# Patient Record
Sex: FEMALE | Race: BLACK OR AFRICAN AMERICAN | NOT HISPANIC OR LATINO | Employment: FULL TIME | URBAN - METROPOLITAN AREA
[De-identification: names, ages, dates, MRNs, and addresses within clinical notes are randomized per-mention and may not be internally consistent; named-entity substitution may affect disease eponyms.]

---

## 2022-12-13 ENCOUNTER — LAB REQUISITION (OUTPATIENT)
Dept: LAB | Facility: HOSPITAL | Age: 57
End: 2022-12-13
Payer: COMMERCIAL

## 2022-12-13 DIAGNOSIS — L08.0 PYODERMA: ICD-10-CM

## 2022-12-13 PROCEDURE — 87070 CULTURE OTHR SPECIMN AEROBIC: CPT | Performed by: SPECIALIST

## 2022-12-16 LAB — BACTERIA SPEC CULT: NORMAL

## 2023-07-28 ENCOUNTER — TELEPHONE (OUTPATIENT)
Dept: TRANSPLANT | Facility: CLINIC | Age: 58
End: 2023-07-28
Payer: COMMERCIAL

## 2023-07-31 ENCOUNTER — TELEPHONE (OUTPATIENT)
Dept: TRANSPLANT | Facility: CLINIC | Age: 58
End: 2023-07-31
Payer: COMMERCIAL

## 2023-12-21 DIAGNOSIS — N28.9 RENAL DISEASE: Primary | ICD-10-CM

## 2023-12-21 RX ORDER — VALACYCLOVIR HYDROCHLORIDE 1 G/1
TABLET, FILM COATED ORAL
COMMUNITY
Start: 2023-12-11 | End: 2023-12-22 | Stop reason: ALTCHOICE

## 2023-12-21 RX ORDER — ACETAMINOPHEN 500 MG
3 TABLET ORAL DAILY
COMMUNITY

## 2023-12-21 RX ORDER — HYDROCHLOROTHIAZIDE 12.5 MG/1
12.5 TABLET ORAL
COMMUNITY
Start: 2023-11-14 | End: 2023-12-22 | Stop reason: ALTCHOICE

## 2023-12-21 RX ORDER — VALSARTAN 160 MG/1
160 TABLET ORAL
COMMUNITY
Start: 2023-12-21 | End: 2024-01-25

## 2023-12-21 RX ORDER — SODIUM BICARBONATE 650 MG/1
650 TABLET ORAL 3 TIMES DAILY
COMMUNITY
Start: 2023-01-30 | End: 2024-03-12

## 2023-12-21 RX ORDER — AMLODIPINE BESYLATE 10 MG/1
10 TABLET ORAL
COMMUNITY
End: 2023-12-22 | Stop reason: ALTCHOICE

## 2023-12-21 RX ORDER — POTASSIUM CHLORIDE 20 MEQ/1
20 TABLET, EXTENDED RELEASE ORAL DAILY
Status: ON HOLD | COMMUNITY
End: 2024-03-15

## 2023-12-21 RX ORDER — AZATHIOPRINE 50 MG/1
50 TABLET ORAL DAILY
COMMUNITY
Start: 2023-11-08

## 2023-12-21 RX ORDER — ASCORBIC ACID 500 MG
500 TABLET ORAL
COMMUNITY
End: 2023-12-22 | Stop reason: ALTCHOICE

## 2023-12-21 RX ORDER — ALLOPURINOL 100 MG/1
100 TABLET ORAL
COMMUNITY
Start: 2023-11-14 | End: 2024-01-25

## 2023-12-21 RX ORDER — PREDNISONE 10 MG/1
10 TABLET ORAL DAILY
COMMUNITY
Start: 2023-11-08

## 2023-12-21 RX ORDER — VITAMIN B COMPLEX
1 CAPSULE ORAL EVERY MORNING
COMMUNITY

## 2023-12-22 ENCOUNTER — OFFICE VISIT (OUTPATIENT)
Dept: SURGERY | Facility: CLINIC | Age: 58
End: 2023-12-22
Payer: COMMERCIAL

## 2023-12-22 VITALS
DIASTOLIC BLOOD PRESSURE: 76 MMHG | BODY MASS INDEX: 28.51 KG/M2 | HEART RATE: 98 BPM | SYSTOLIC BLOOD PRESSURE: 134 MMHG | HEIGHT: 67 IN | WEIGHT: 181.63 LBS

## 2023-12-22 DIAGNOSIS — N28.9 RENAL DISEASE: ICD-10-CM

## 2023-12-22 DIAGNOSIS — N28.9 RENAL DISEASE: Primary | ICD-10-CM

## 2023-12-22 DIAGNOSIS — Z01.818 PRE-OPERATIVE EXAMINATION: Primary | ICD-10-CM

## 2023-12-22 PROCEDURE — 4010F PR ACE/ARB THEARPY RXD/TAKEN: ICD-10-PCS | Mod: CPTII,,, | Performed by: SURGERY

## 2023-12-22 PROCEDURE — 3075F SYST BP GE 130 - 139MM HG: CPT | Mod: CPTII,,, | Performed by: SURGERY

## 2023-12-22 PROCEDURE — 3078F DIAST BP <80 MM HG: CPT | Mod: CPTII,,, | Performed by: SURGERY

## 2023-12-22 PROCEDURE — 99204 OFFICE O/P NEW MOD 45 MIN: CPT | Mod: ,,, | Performed by: SURGERY

## 2023-12-22 PROCEDURE — 4010F ACE/ARB THERAPY RXD/TAKEN: CPT | Mod: CPTII,,, | Performed by: SURGERY

## 2023-12-22 PROCEDURE — 3075F PR MOST RECENT SYSTOLIC BLOOD PRESS GE 130-139MM HG: ICD-10-PCS | Mod: CPTII,,, | Performed by: SURGERY

## 2023-12-22 PROCEDURE — 3078F PR MOST RECENT DIASTOLIC BLOOD PRESSURE < 80 MM HG: ICD-10-PCS | Mod: CPTII,,, | Performed by: SURGERY

## 2023-12-22 PROCEDURE — 1159F PR MEDICATION LIST DOCUMENTED IN MEDICAL RECORD: ICD-10-PCS | Mod: CPTII,,, | Performed by: SURGERY

## 2023-12-22 PROCEDURE — 1159F MED LIST DOCD IN RCRD: CPT | Mod: CPTII,,, | Performed by: SURGERY

## 2023-12-22 PROCEDURE — 3008F PR BODY MASS INDEX (BMI) DOCUMENTED: ICD-10-PCS | Mod: CPTII,,, | Performed by: SURGERY

## 2023-12-22 PROCEDURE — 3008F BODY MASS INDEX DOCD: CPT | Mod: CPTII,,, | Performed by: SURGERY

## 2023-12-22 PROCEDURE — 99204 PR OFFICE/OUTPT VISIT, NEW, LEVL IV, 45-59 MIN: ICD-10-PCS | Mod: ,,, | Performed by: SURGERY

## 2023-12-22 RX ORDER — ZINC GLUCONATE 50 MG
50 TABLET ORAL DAILY
COMMUNITY

## 2023-12-22 NOTE — PROGRESS NOTES
Patient ID: [id number]     Chief Complaint: Consult (PD cath placement)      HPI:     Sulma Gonzales is a 58 y.o. female here today for evaluation for peritoneal dialysis catheter.  Patient had a kidney transplant 39 years ago which has since failed, she was now on hemodialysis with a tunneled hemodialysis catheter, she was referred to me for placement of peritoneal dialysis catheter, she prefers this method and that is the reason for her visit today    Past Medical History:   Diagnosis Date    Allergy     Iodine    Anemia     Chronic kidney disease on chronic dialysis     Chronic kidney disease, unspecified     Encounter for blood transfusion 3033    Hypertension     Menopause     Vertigo     From covid shot        Past Surgical History:   Procedure Laterality Date     SECTION      HYSTERECTOMY      KIDNEY TRANSPLANT      TUBAL LIGATION          Social History     Tobacco Use    Smoking status: Never    Smokeless tobacco: Never   Substance and Sexual Activity    Alcohol use: Never    Drug use: Never    Sexual activity: Yes     Partners: Male        Current Outpatient Medications   Medication Instructions    allopurinoL (ZYLOPRIM) 100 MG tablet Oral    azaTHIOprine (IMURAN) 50 mg, Oral    b complex vitamins capsule 1 capsule, Oral, Every morning    cholecalciferol, vitamin D3, (VITAMIN D3) 50 mcg (2,000 unit) Cap capsule 1 tablet, Oral, Daily    FERROUS SULFATE ORAL 3 tablets, Oral, Daily    potassium chloride SA (K-DUR,KLOR-CON) 20 MEQ tablet 20 mEq, Oral    predniSONE (DELTASONE) 10 mg, Oral    sodium bicarbonate 650 mg, Oral    valsartan (DIOVAN) 160 mg, Oral    zinc gluconate 50 mg, Oral, Daily       Review of patient's allergies indicates:   Allergen Reactions    Iodine     Shellfish containing products Rash        Patient Care Team:  No, Primary Doctor as PCP - General  Julio Lofton MD as Surgeon (General Surgery)  Yakelin Person Jr. (Internal Medicine)  Lebron Singleton MD  "(Nephrology)  [facility], Cardiology Specialist Of [facility]     Subjective:     Review of Systems    12 point review of systems conducted, negative except as stated in the history of present illness. See HPI for details.  No fever chills nausea vomiting new line no diarrhea constipation hematochezia or hemoptysis   No wheezing shortness of breath  No chest pain palpitations or pedal edema  Objective:     Visit Vitals  /76 (BP Location: Right arm, Patient Position: Sitting)   Pulse 98   Ht 5' 7" (1.702 m)   Wt 82.4 kg (181 lb 9.6 oz)   BMI 28.44 kg/m²       Physical Exam  Constitutional:       Appearance: Normal appearance.   HENT:      Head: Normocephalic and atraumatic.      Mouth/Throat:      Mouth: Mucous membranes are moist.      Pharynx: Oropharynx is clear.   Eyes:      Extraocular Movements: Extraocular movements intact.      Conjunctiva/sclera: Conjunctivae normal.      Pupils: Pupils are equal, round, and reactive to light.   Neck:      Vascular: No carotid bruit.   Cardiovascular:      Rate and Rhythm: Normal rate and regular rhythm.   Pulmonary:      Effort: No respiratory distress.      Breath sounds: Stridor present. Rhonchi present. No wheezing or rales.   Chest:      Chest wall: Tenderness present.   Abdominal:      General: There is no distension.      Palpations: There is no mass.      Tenderness: There is no abdominal tenderness. There is no guarding or rebound.   Musculoskeletal:      Cervical back: No rigidity or tenderness.   Lymphadenopathy:      Cervical: No cervical adenopathy.   Neurological:      General: No focal deficit present.      Mental Status: She is alert and oriented to person, place, and time. Mental status is at baseline.      Cranial Nerves: No cranial nerve deficit.   Psychiatric:         Mood and Affect: Mood normal.         Thought Content: Thought content normal.         Judgment: Judgment normal.         Assessment:       ICD-10-CM ICD-9-CM   1. Pre-operative examination  " Z01.818 V72.84   2. Renal disease  N28.9 593.9        Plan:     1. Pre-operative examination  -     X-Ray Chest 1 View; Future; Expected date: 12/22/2023  -     EKG 12-lead; Future; Expected date: 12/22/2023    2. Renal disease  -     Ambulatory referral/consult to General Surgery     We will schedule for laparoscopic placement of peritoneal dialysis catheter on January 2, 202 4    No follow-ups on file. In addition to their scheduled follow up, the patient has also been instructed to follow up on as needed basis.     No future appointments.     Julio Lofton MD

## 2023-12-22 NOTE — H&P (VIEW-ONLY)
Patient ID: [id number]     Chief Complaint: Consult (PD cath placement)      HPI:     Sulma Gonzales is a 58 y.o. female here today for evaluation for peritoneal dialysis catheter.  Patient had a kidney transplant 39 years ago which has since failed, she was now on hemodialysis with a tunneled hemodialysis catheter, she was referred to me for placement of peritoneal dialysis catheter, she prefers this method and that is the reason for her visit today    Past Medical History:   Diagnosis Date    Allergy     Iodine    Anemia     Chronic kidney disease on chronic dialysis     Chronic kidney disease, unspecified     Encounter for blood transfusion 3033    Hypertension     Menopause     Vertigo     From covid shot        Past Surgical History:   Procedure Laterality Date     SECTION      HYSTERECTOMY      KIDNEY TRANSPLANT      TUBAL LIGATION          Social History     Tobacco Use    Smoking status: Never    Smokeless tobacco: Never   Substance and Sexual Activity    Alcohol use: Never    Drug use: Never    Sexual activity: Yes     Partners: Male        Current Outpatient Medications   Medication Instructions    allopurinoL (ZYLOPRIM) 100 MG tablet Oral    azaTHIOprine (IMURAN) 50 mg, Oral    b complex vitamins capsule 1 capsule, Oral, Every morning    cholecalciferol, vitamin D3, (VITAMIN D3) 50 mcg (2,000 unit) Cap capsule 1 tablet, Oral, Daily    FERROUS SULFATE ORAL 3 tablets, Oral, Daily    potassium chloride SA (K-DUR,KLOR-CON) 20 MEQ tablet 20 mEq, Oral    predniSONE (DELTASONE) 10 mg, Oral    sodium bicarbonate 650 mg, Oral    valsartan (DIOVAN) 160 mg, Oral    zinc gluconate 50 mg, Oral, Daily       Review of patient's allergies indicates:   Allergen Reactions    Iodine     Shellfish containing products Rash        Patient Care Team:  No, Primary Doctor as PCP - General  Julio Lofton MD as Surgeon (General Surgery)  Yakelin Person Jr. (Internal  "Medicine)  Lebron Singleton MD (Nephrology)  [facility], Cardiology Specialist Of [facility]     Subjective:     Review of Systems    12 point review of systems conducted, negative except as stated in the history of present illness. See HPI for details.  No fever chills nausea vomiting new line no diarrhea constipation hematochezia or hemoptysis   No wheezing shortness of breath  No chest pain palpitations or pedal edema  Objective:     Visit Vitals  /76 (BP Location: Right arm, Patient Position: Sitting)   Pulse 98   Ht 5' 7" (1.702 m)   Wt 82.4 kg (181 lb 9.6 oz)   BMI 28.44 kg/m²       Physical Exam  Constitutional:       Appearance: Normal appearance.   HENT:      Head: Normocephalic and atraumatic.      Mouth/Throat:      Mouth: Mucous membranes are moist.      Pharynx: Oropharynx is clear.   Eyes:      Extraocular Movements: Extraocular movements intact.      Conjunctiva/sclera: Conjunctivae normal.      Pupils: Pupils are equal, round, and reactive to light.   Neck:      Vascular: No carotid bruit.   Cardiovascular:      Rate and Rhythm: Normal rate and regular rhythm.   Pulmonary:      Effort: No respiratory distress.      Breath sounds: Stridor present. Rhonchi present. No wheezing or rales.   Chest:      Chest wall: Tenderness present.   Abdominal:      General: There is no distension.      Palpations: There is no mass.      Tenderness: There is no abdominal tenderness. There is no guarding or rebound.   Musculoskeletal:      Cervical back: No rigidity or tenderness.   Lymphadenopathy:      Cervical: No cervical adenopathy.   Neurological:      General: No focal deficit present.      Mental Status: She is alert and oriented to person, place, and time. Mental status is at baseline.      Cranial Nerves: No cranial nerve deficit.   Psychiatric:         Mood and Affect: Mood normal.         Thought Content: Thought content normal.         Judgment: Judgment normal.         Assessment:       ICD-10-CM ICD-9-CM "   1. Pre-operative examination  Z01.818 V72.84   2. Renal disease  N28.9 593.9        Plan:     1. Pre-operative examination  -     X-Ray Chest 1 View; Future; Expected date: 12/22/2023  -     EKG 12-lead; Future; Expected date: 12/22/2023    2. Renal disease  -     Ambulatory referral/consult to General Surgery     We will schedule for laparoscopic placement of peritoneal dialysis catheter on January 2, 202 4    No follow-ups on file. In addition to their scheduled follow up, the patient has also been instructed to follow up on as needed basis.     No future appointments.     Julio Lofton MD

## 2023-12-29 NOTE — DISCHARGE INSTRUCTIONS
Patient Education       Peritoneal Dialysis Catheter Placement Discharge Instructions   About this topic   Peritoneal dialysis, or PD, replaces the work of your kidneys. It removes extra water, wastes, and chemicals from your body when your kidneys are not working normally. This type of dialysis uses the lining of your belly, the peritoneum, to filter the wastes from your blood. Your doctor places a tube or PD catheter in your belly. The doctor leaves about 6 to 12 inches (15 to 30 cm) of this tube outside of your belly. Then, you are able to put the dialysis solution in your body through the tube. Later, the fluid, extra water, and wastes all drain out of this same tube.     What care is needed at home?   Learn about your catheter.  You will work with a nurse after you leave the hospital. The nurse will help you learn how to care for your PD catheter. You will also learn how to exchange the PD fluid. This will be based on what kind of dialysis is ordered for you.  Keep your supplies for your PD in a clean, dry space.  Change your bandages around your catheter as told by your doctor.   You may take a bath or shower as told by your doctor.  Do not lift anything over 10 pounds (4.5 kg) until released by your doctor.  Ask your doctor when you may go back to your normal activities like work or driving  Clean your catheter.  Wash your hands with care before and after you touch the catheter or dressing and use a mask.  Hold the catheter during cleaning to avoid accidental pulling.  Clean the skin on all sides of the catheter every day as your doctor ordered.  Do not remove any scabs or crusting. The catheter has cuffs or bands of fuzzy Dacron under your skin. Your skin grows over the cuffs to help hold the catheter in place. This also helps to stop infection.  Your doctor may order an ointment or cream that you need to put on to the catheter exit site when you change the dressing. Use a clean cotton-tip swab to put on  the ointment or cream.  Secure your catheter.  Make sure your catheter it taped to your belly. Do not let it hang loose or dangle.  Use tapes and dressings that will let air enter the skin.  Loop your catheter to make a C shape before putting the dressing on. This will give a little support if you accidentally pull on your catheter.  Wear loose clothing that will not rub on your catheter.  Ask your doctor about underwear and belts mostly built for people with peritoneal dialysis catheters.  What follow-up care is needed?   Be sure to keep your follow up visit.  You may need to return to your doctor once a week to have your catheter flushed until you are able to use it.  If you have stitches or staples, you will need to have them taken out. Your doctor will often want to do this in 1 to 2 weeks.  Will physical activity be limited?   You may need to limit your movements when your peritoneal cavity is full. Talk to your doctor about the right amount of activity for you.  Try not to go to crowded places where your belly can easily be bumped.  What changes to diet are needed?   Your doctor will have you meet with a dietitian. Together you can plan your meals to help you stay healthy. You need to know the salt and potassium content of the foods you eat. Read food labels with care. They will show you how much of each of these is in a serving. This amount is given as a percentage of the total amount you need each day. Reading the labels will help you make healthy food choices. You may need to adjust your eating based on how well your PD treatment is working. Ask your doctor how much fluid you should drink each day.  What problems could happen?   Infection  Bleeding  Damage to internal organs  Tube does not work or is in the wrong place  Fluid leaks around the catheter  Hernia  High blood sugar levels  Weight gain  Catheter clogs  Peritoneal dialysis does not work well enough  When do I need to call the doctor?   Signs of  infection. These include a fever of 100.4°F (38°C) or higher, chills, or belly pain which may be mild to very bad.  Signs of wound infection. These include swelling, redness, warmth around the wound; too much pain when touched; yellowish, greenish, or bloody discharge; foul smell coming from the catheter site; catheter site opens up.  Dialysis solution looks cloudy or bloody  Very bad upset stomach and throwing up  Helpful tips   Do not warm your PD solution in a microwave.  Plan ahead to get supplies so you do not run out of them.

## 2023-12-30 ENCOUNTER — ANESTHESIA EVENT (OUTPATIENT)
Dept: SURGERY | Facility: HOSPITAL | Age: 58
End: 2023-12-30
Payer: COMMERCIAL

## 2023-12-30 RX ORDER — SODIUM CHLORIDE, SODIUM LACTATE, POTASSIUM CHLORIDE, CALCIUM CHLORIDE 600; 310; 30; 20 MG/100ML; MG/100ML; MG/100ML; MG/100ML
INJECTION, SOLUTION INTRAVENOUS CONTINUOUS
Status: CANCELLED | OUTPATIENT
Start: 2023-12-30

## 2023-12-30 RX ORDER — LIDOCAINE HYDROCHLORIDE 10 MG/ML
1 INJECTION, SOLUTION EPIDURAL; INFILTRATION; INTRACAUDAL; PERINEURAL ONCE
Status: CANCELLED | OUTPATIENT
Start: 2023-12-30 | End: 2023-12-30

## 2023-12-30 NOTE — ANESTHESIA PREPROCEDURE EVALUATION
12/30/2023  Sulma Gonzales is a 58 y.o., female with HTN and ESRD for placement of peritoneal dialysis catheter.      Pre-op Assessment    I have reviewed the Patient Summary Reports.     I have reviewed the Nursing Notes. I have reviewed the NPO Status.   I have reviewed the Medications.     Review of Systems  Anesthesia Hx:  No problems with previous Anesthesia             Denies Family Hx of Anesthesia complications.    Denies Personal Hx of Anesthesia complications.                    Hematology/Oncology:  Hematology Normal   Oncology Normal                                   EENT/Dental:  EENT/Dental Normal    Ears General/Symptom(s)    Ear Disease:  Tympanic Membrane Problem        Cardiovascular:  Cardiovascular Normal                                            Pulmonary:  Pulmonary Normal                       Renal/:  Renal/ Normal                 Hepatic/GI:  Hepatic/GI Normal                 Musculoskeletal:  Musculoskeletal Normal                Neurological:  Neurology Normal                                      Endocrine:  Endocrine Normal            Dermatological:  Skin Normal    Psych:  Psychiatric Normal                    Physical Exam  General: Well nourished    Airway:  Mallampati: II   Mouth Opening: Normal  TM Distance: Normal  Tongue: Normal  Neck ROM: Normal ROM    Dental:  Intact    Chest/Lungs:  Clear to auscultation, Normal Respiratory Rate    Heart:  Rate: Normal  Rhythm: Regular Rhythm        Anesthesia Plan  Type of Anesthesia, risks & benefits discussed:    Anesthesia Type: Gen ETT  Intra-op Monitoring Plan: Standard ASA Monitors  Post Op Pain Control Plan: multimodal analgesia and IV/PO Opioids PRN  Induction:  IV  Airway Plan: Direct  Informed Consent: Informed consent signed with the Patient and all parties understand the risks and agree with anesthesia plan.  All  questions answered.   ASA Score: 3  Day of Surgery Review of History & Physical: H&P Update referred to the surgeon/provider.I have interviewed and examined the patient. I have reviewed the patient's H&P dated: There are no significant changes. H&P completed by Anesthesiologist.    Ready For Surgery From Anesthesia Perspective.     .

## 2023-12-31 RX ORDER — LIDOCAINE HYDROCHLORIDE 10 MG/ML
1 INJECTION, SOLUTION EPIDURAL; INFILTRATION; INTRACAUDAL; PERINEURAL ONCE AS NEEDED
Status: CANCELLED | OUTPATIENT
Start: 2023-12-31 | End: 2035-05-29

## 2024-01-02 ENCOUNTER — ANESTHESIA (OUTPATIENT)
Dept: SURGERY | Facility: HOSPITAL | Age: 59
End: 2024-01-02
Payer: COMMERCIAL

## 2024-01-02 ENCOUNTER — HOSPITAL ENCOUNTER (OUTPATIENT)
Facility: HOSPITAL | Age: 59
Discharge: HOME | End: 2024-01-02
Attending: SURGERY | Admitting: SURGERY
Payer: COMMERCIAL

## 2024-01-02 LAB
ANION GAP SERPL CALC-SCNC: 16 MMOL/L (ref 8–16)
BUN SERPL-MCNC: 28 MG/DL (ref 6–30)
CHLORIDE SERPL-SCNC: 105 MMOL/L (ref 95–110)
CREAT SERPL-MCNC: 4.8 MG/DL (ref 0.5–1.4)
GLUCOSE SERPL-MCNC: 91 MG/DL (ref 70–110)
HCT VFR BLD CALC: 33 %PCV (ref 36–54)
HGB BLD-MCNC: 11 G/DL
POC IONIZED CALCIUM: 1.25 MMOL/L (ref 1.06–1.42)
POC TCO2 (MEASURED): 25 MMOL/L (ref 23–29)
POTASSIUM BLD-SCNC: 3.9 MMOL/L (ref 3.5–5.1)
SAMPLE: ABNORMAL
SODIUM BLD-SCNC: 141 MMOL/L (ref 136–145)

## 2024-01-02 PROCEDURE — 37000008 HC ANESTHESIA 1ST 15 MINUTES: Performed by: SURGERY

## 2024-01-02 PROCEDURE — 49324 LAP INSERT TUNNEL IP CATH: CPT | Mod: ,,, | Performed by: SURGERY

## 2024-01-02 PROCEDURE — 25000003 PHARM REV CODE 250: Performed by: SURGERY

## 2024-01-02 PROCEDURE — 25000003 PHARM REV CODE 250: Performed by: ANESTHESIOLOGY

## 2024-01-02 PROCEDURE — C1750 CATH, HEMODIALYSIS,LONG-TERM: HCPCS | Performed by: SURGERY

## 2024-01-02 PROCEDURE — D9220A PRA ANESTHESIA: Mod: ANES,,, | Performed by: ANESTHESIOLOGY

## 2024-01-02 PROCEDURE — 71000016 HC POSTOP RECOV ADDL HR: Performed by: SURGERY

## 2024-01-02 PROCEDURE — 37000009 HC ANESTHESIA EA ADD 15 MINS: Performed by: SURGERY

## 2024-01-02 PROCEDURE — D9220A PRA ANESTHESIA: Mod: CRNA,,, | Performed by: NURSE ANESTHETIST, CERTIFIED REGISTERED

## 2024-01-02 PROCEDURE — 71000033 HC RECOVERY, INTIAL HOUR: Performed by: SURGERY

## 2024-01-02 PROCEDURE — 63600175 PHARM REV CODE 636 W HCPCS: Performed by: SURGERY

## 2024-01-02 PROCEDURE — 63600175 PHARM REV CODE 636 W HCPCS: Performed by: NURSE ANESTHETIST, CERTIFIED REGISTERED

## 2024-01-02 PROCEDURE — 63600175 PHARM REV CODE 636 W HCPCS

## 2024-01-02 PROCEDURE — 63600175 PHARM REV CODE 636 W HCPCS: Performed by: ANESTHESIOLOGY

## 2024-01-02 PROCEDURE — 27201423 OPTIME MED/SURG SUP & DEVICES STERILE SUPPLY: Performed by: SURGERY

## 2024-01-02 PROCEDURE — 71000015 HC POSTOP RECOV 1ST HR: Performed by: SURGERY

## 2024-01-02 PROCEDURE — 36000708 HC OR TIME LEV III 1ST 15 MIN: Performed by: SURGERY

## 2024-01-02 PROCEDURE — 36000709 HC OR TIME LEV III EA ADD 15 MIN: Performed by: SURGERY

## 2024-01-02 PROCEDURE — 25000003 PHARM REV CODE 250: Performed by: NURSE ANESTHETIST, CERTIFIED REGISTERED

## 2024-01-02 DEVICE — CATH SWAN MISSOURI L 62.5CM: Type: IMPLANTABLE DEVICE | Site: ABDOMEN | Status: FUNCTIONAL

## 2024-01-02 RX ORDER — METOCLOPRAMIDE HYDROCHLORIDE 5 MG/ML
10 INJECTION INTRAMUSCULAR; INTRAVENOUS ONCE AS NEEDED
Status: ACTIVE | OUTPATIENT
Start: 2024-01-02 | End: 2035-05-31

## 2024-01-02 RX ORDER — PHENYLEPHRINE HYDROCHLORIDE 10 MG/ML
INJECTION INTRAVENOUS
Status: DISCONTINUED | OUTPATIENT
Start: 2024-01-02 | End: 2024-01-02

## 2024-01-02 RX ORDER — ACETAMINOPHEN 500 MG
1000 TABLET ORAL
Status: COMPLETED | OUTPATIENT
Start: 2024-01-02 | End: 2024-01-02

## 2024-01-02 RX ORDER — GABAPENTIN 300 MG/1
300 CAPSULE ORAL
Status: COMPLETED | OUTPATIENT
Start: 2024-01-02 | End: 2024-01-02

## 2024-01-02 RX ORDER — HEPARIN SODIUM 5000 [USP'U]/ML
INJECTION, SOLUTION INTRAVENOUS; SUBCUTANEOUS
Status: DISCONTINUED | OUTPATIENT
Start: 2024-01-02 | End: 2024-01-02 | Stop reason: HOSPADM

## 2024-01-02 RX ORDER — HEPARIN SODIUM 5000 [USP'U]/ML
INJECTION, SOLUTION INTRAVENOUS; SUBCUTANEOUS
Status: DISCONTINUED
Start: 2024-01-02 | End: 2024-01-02 | Stop reason: HOSPADM

## 2024-01-02 RX ORDER — HYDROMORPHONE HYDROCHLORIDE 2 MG/ML
0.2 INJECTION, SOLUTION INTRAMUSCULAR; INTRAVENOUS; SUBCUTANEOUS EVERY 5 MIN PRN
Status: ACTIVE | OUTPATIENT
Start: 2024-01-02

## 2024-01-02 RX ORDER — OXYCODONE HYDROCHLORIDE 5 MG/1
5 TABLET ORAL
Status: ACTIVE | OUTPATIENT
Start: 2024-01-02

## 2024-01-02 RX ORDER — PROPOFOL 10 MG/ML
VIAL (ML) INTRAVENOUS
Status: DISCONTINUED | OUTPATIENT
Start: 2024-01-02 | End: 2024-01-02

## 2024-01-02 RX ORDER — CEFAZOLIN SODIUM 1 G/3ML
INJECTION, POWDER, FOR SOLUTION INTRAMUSCULAR; INTRAVENOUS
Status: DISCONTINUED
Start: 2024-01-02 | End: 2024-01-02 | Stop reason: HOSPADM

## 2024-01-02 RX ORDER — TRAMADOL HYDROCHLORIDE 50 MG/1
100 TABLET ORAL
Status: COMPLETED | OUTPATIENT
Start: 2024-01-02 | End: 2024-01-02

## 2024-01-02 RX ORDER — DROPERIDOL 2.5 MG/ML
0.25 INJECTION, SOLUTION INTRAMUSCULAR; INTRAVENOUS ONCE AS NEEDED
Status: DISPENSED | OUTPATIENT
Start: 2024-01-02 | End: 2035-05-31

## 2024-01-02 RX ORDER — ONDANSETRON HYDROCHLORIDE 2 MG/ML
INJECTION, SOLUTION INTRAMUSCULAR; INTRAVENOUS
Status: DISCONTINUED | OUTPATIENT
Start: 2024-01-02 | End: 2024-01-02

## 2024-01-02 RX ORDER — BUPIVACAINE HYDROCHLORIDE AND EPINEPHRINE 2.5; 5 MG/ML; UG/ML
INJECTION, SOLUTION EPIDURAL; INFILTRATION; INTRACAUDAL; PERINEURAL
Status: DISCONTINUED | OUTPATIENT
Start: 2024-01-02 | End: 2024-01-02 | Stop reason: HOSPADM

## 2024-01-02 RX ORDER — CEFAZOLIN SODIUM IN 0.9 % NACL 2 G/100 ML
PLASTIC BAG, INJECTION (ML) INTRAVENOUS
Status: DISCONTINUED | OUTPATIENT
Start: 2024-01-02 | End: 2024-01-02

## 2024-01-02 RX ORDER — BUPIVACAINE HYDROCHLORIDE 5 MG/ML
INJECTION, SOLUTION EPIDURAL; INTRACAUDAL
Status: DISCONTINUED
Start: 2024-01-02 | End: 2024-01-02 | Stop reason: HOSPADM

## 2024-01-02 RX ORDER — DIPHENHYDRAMINE HYDROCHLORIDE 50 MG/ML
6.25 INJECTION, SOLUTION INTRAMUSCULAR; INTRAVENOUS ONCE AS NEEDED
Status: ACTIVE | OUTPATIENT
Start: 2024-01-02 | End: 2035-05-31

## 2024-01-02 RX ORDER — LIDOCAINE HYDROCHLORIDE 10 MG/ML
INJECTION, SOLUTION EPIDURAL; INFILTRATION; INTRACAUDAL; PERINEURAL
Status: DISCONTINUED | OUTPATIENT
Start: 2024-01-02 | End: 2024-01-02

## 2024-01-02 RX ORDER — MIDAZOLAM HYDROCHLORIDE 1 MG/ML
1 INJECTION INTRAMUSCULAR; INTRAVENOUS ONCE AS NEEDED
Status: COMPLETED | OUTPATIENT
Start: 2024-01-02 | End: 2024-01-02

## 2024-01-02 RX ORDER — FENTANYL CITRATE 50 UG/ML
INJECTION, SOLUTION INTRAMUSCULAR; INTRAVENOUS
Status: DISCONTINUED | OUTPATIENT
Start: 2024-01-02 | End: 2024-01-02

## 2024-01-02 RX ORDER — SODIUM CHLORIDE, SODIUM LACTATE, POTASSIUM CHLORIDE, CALCIUM CHLORIDE 600; 310; 30; 20 MG/100ML; MG/100ML; MG/100ML; MG/100ML
INJECTION, SOLUTION INTRAVENOUS CONTINUOUS
Status: CANCELLED | OUTPATIENT
Start: 2024-01-02

## 2024-01-02 RX ORDER — SODIUM CHLORIDE 9 MG/ML
INJECTION, SOLUTION INTRAVENOUS CONTINUOUS
Status: ACTIVE | OUTPATIENT
Start: 2024-01-02

## 2024-01-02 RX ORDER — ROCURONIUM BROMIDE 10 MG/ML
INJECTION, SOLUTION INTRAVENOUS
Status: DISCONTINUED | OUTPATIENT
Start: 2024-01-02 | End: 2024-01-02

## 2024-01-02 RX ORDER — LIDOCAINE HYDROCHLORIDE 10 MG/ML
1 INJECTION, SOLUTION EPIDURAL; INFILTRATION; INTRACAUDAL; PERINEURAL ONCE
Status: CANCELLED | OUTPATIENT
Start: 2024-01-02 | End: 2024-01-02

## 2024-01-02 RX ADMIN — ROCURONIUM BROMIDE 30 MG: 50 INJECTION INTRAVENOUS at 10:01

## 2024-01-02 RX ADMIN — TRAMADOL HYDROCHLORIDE 100 MG: 50 TABLET, COATED ORAL at 09:01

## 2024-01-02 RX ADMIN — PHENYLEPHRINE HYDROCHLORIDE 100 MCG: 10 INJECTION INTRAVENOUS at 10:01

## 2024-01-02 RX ADMIN — SODIUM CHLORIDE: 9 INJECTION, SOLUTION INTRAVENOUS at 10:01

## 2024-01-02 RX ADMIN — Medication 2 G: at 10:01

## 2024-01-02 RX ADMIN — SUGAMMADEX 100 MG: 100 INJECTION, SOLUTION INTRAVENOUS at 10:01

## 2024-01-02 RX ADMIN — ONDANSETRON 4 MG: 2 INJECTION INTRAMUSCULAR; INTRAVENOUS at 10:01

## 2024-01-02 RX ADMIN — PHENYLEPHRINE HYDROCHLORIDE 150 MCG: 10 INJECTION INTRAVENOUS at 10:01

## 2024-01-02 RX ADMIN — MIDAZOLAM HYDROCHLORIDE 2 MG: 1 INJECTION, SOLUTION INTRAMUSCULAR; INTRAVENOUS at 10:01

## 2024-01-02 RX ADMIN — PROPOFOL 200 MG: 10 INJECTION, EMULSION INTRAVENOUS at 10:01

## 2024-01-02 RX ADMIN — FENTANYL CITRATE 50 MCG: 50 INJECTION, SOLUTION INTRAMUSCULAR; INTRAVENOUS at 10:01

## 2024-01-02 RX ADMIN — GABAPENTIN 300 MG: 300 CAPSULE ORAL at 09:01

## 2024-01-02 RX ADMIN — PHENYLEPHRINE HYDROCHLORIDE 200 MCG: 10 INJECTION INTRAVENOUS at 10:01

## 2024-01-02 RX ADMIN — ACETAMINOPHEN 1000 MG: 500 TABLET ORAL at 09:01

## 2024-01-02 RX ADMIN — LIDOCAINE HYDROCHLORIDE 20 MG: 10 INJECTION, SOLUTION EPIDURAL; INFILTRATION; INTRACAUDAL; PERINEURAL at 10:01

## 2024-01-02 NOTE — DISCHARGE SUMMARY
[facility] Surgical - Periop Services  Discharge Note  Short Stay    Procedure(s) (LRB):  INSERTION, CATHETER, DIALYSIS, PERITONEAL, LAPAROSCOPIC (N/A)      OUTCOME: Condition has improved and patient is now ready for discharge.    DISPOSITION: Home or Self Care    FINAL DIAGNOSIS:  <principal problem not specified>    FOLLOWUP: In clinic    DISCHARGE INSTRUCTIONS:  No discharge procedures on file.     TIME SPENT ON DISCHARGE: 5 minutes

## 2024-01-02 NOTE — TRANSFER OF CARE
Anesthesia Transfer of Care Note    Patient: Sulma M Orphe    Procedure(s) Performed: Procedure(s) (LRB):  INSERTION, CATHETER, DIALYSIS, PERITONEAL, LAPAROSCOPIC (N/A)    Patient location: PACU    Anesthesia Type: general    Transport from OR: Transported from OR on room air with adequate spontaneous ventilation    Post pain: adequate analgesia    Post assessment: no apparent anesthetic complications and tolerated procedure well    Post vital signs: stable    Level of consciousness: sedated    Nausea/Vomiting: no nausea/vomiting    Complications: none    Transfer of care protocol was followed

## 2024-01-02 NOTE — PLAN OF CARE
Pt is [id number] denies c/o-libby score 10-she meets criteria for phase2 care per dr gamboa  To rm 6 via bed w/ deya

## 2024-01-02 NOTE — ANESTHESIA PROCEDURE NOTES
Intubation    Date/Time: 1/2/2024 10:26 AM    Performed by: Madhavi Melara CRNA  Authorized by: Erica Bustamante MD    Intubation:     Induction:  Intravenous    Intubated:  Postinduction    Mask Ventilation:  Easy mask    Attempts:  1    Attempted By:  CRNA    Method of Intubation:  Direct    Blade:  Arnol 3    Laryngeal View Grade: Grade I - full view of cords      Difficult Airway Encountered?: No      Complications:  None    Airway Device:  Oral endotracheal tube    Airway Device Size:  7.0    Style/Cuff Inflation:  Cuffed (inflated to minimal occlusive pressure)    Inflation Amount (mL):  6    Tube secured:  21    Secured at:  The lips    Placement Verified By:  Capnometry    Complicating Factors:  None    Findings Post-Intubation:  BS equal bilateral

## 2024-01-02 NOTE — OP NOTE
Date of operation:  January 2, 2024      Surgeon: Julio Lofton MD      Operation: Laparoscopic placement of peritoneal dialysis catheter      Indications:  58-year-old female with chronic renal failure in need of peritoneal dialysis      Preop diagnosis:  Chronic renal failure, CKD stage 4      Postop diagnosis:  Same      Complications:  None      Blood loss:  2 cc      Disposition: Stable satisfactory to PACU      Condition:  Stable satisfactory      Anesthesia: General endotracheal      Details of procedure: Patient was brought to the operating room laid supine on the operative table, general anesthesia was administered he was intubated endotracheally     The abdomen was prepped and draped in usual sterile fashion      A left upper quadrant 5 mm Optiview trocar was placed pneumoperitoneum was achieved, an 11 mm trocar was placed in the right upper quadrant and directed at a 45 degree angle towards the pelvis, and a 5 mm port was placed in the left flank      A coiled tip dual tissue cuffed peritoneal dialysis catheter was advanced through the 11 mm trocar, the coiled tip was positioned in the pelvis and the larger distal tissue cuff was left within the rectus abdominis muscle in the tail was pulled across the patient's midline with the exit site in the left upper quadrant, leaving the smaller proximal tissue cuff within the subcutaneous space      A 2-0 nylon suture was placed with a laparoscopic suture Passer to perform a catheter pexy into the pelvis to prevent migration     The the catheter was flushed with heparinized saline then straight heparin to prevent early clotting     All ports were removed under direct laparoscopic vision, all skin incisions were closed with 4-0 subcuticular Vicryl sutures and sterile dressings      Patient tolerated procedure well was extubated and brought to PACU in stable condition thank you

## 2024-01-02 NOTE — ANESTHESIA POSTPROCEDURE EVALUATION
Anesthesia Post Evaluation    Patient: Sulma MCKEON Orphtrace    Procedure(s) Performed: Procedure(s) (LRB):  INSERTION, CATHETER, DIALYSIS, PERITONEAL, LAPAROSCOPIC (N/A)    Final Anesthesia Type: general      Patient location during evaluation: PACU  Patient participation: Yes- Able to Participate  Level of consciousness: awake and alert  Post-procedure vital signs: reviewed and stable  Pain management: adequate  Airway patency: patent    PONV status at discharge: No PONV  Anesthetic complications: no      Cardiovascular status: hemodynamically stable  Respiratory status: unassisted  Hydration status: euvolemic  Follow-up not needed.              Vitals Value Taken Time   BP 87/57 01/02/24 1121   Temp 37 °C (98.6 °F) 01/02/24 1100   Pulse 94 01/02/24 1121   Resp 15 01/02/24 1121   SpO2 98 % 01/02/24 1121   Vitals shown include unvalidated device data.      No case tracking events are documented in the log.      Pain/Dunia Score: Pain Rating Prior to Med Admin: 0 (1/2/2024  9:14 AM)  Dunia Score: 8 (1/2/2024 11:00 AM)

## 2024-01-03 ENCOUNTER — TELEPHONE (OUTPATIENT)
Dept: SURGERY | Facility: CLINIC | Age: 59
End: 2024-01-03
Payer: COMMERCIAL

## 2024-01-03 VITALS
RESPIRATION RATE: 16 BRPM | BODY MASS INDEX: 27.28 KG/M2 | WEIGHT: 180 LBS | HEART RATE: 87 BPM | DIASTOLIC BLOOD PRESSURE: 55 MMHG | SYSTOLIC BLOOD PRESSURE: 87 MMHG | TEMPERATURE: 98 F | OXYGEN SATURATION: 92 % | HEIGHT: 68 IN

## 2024-01-03 NOTE — TELEPHONE ENCOUNTER
Surgeon:  Dr. Julio Lofton   Procedure:  PD Cath Placement  Procedure Date:  1/2/24  Patient Concerns:   high heart rate low BP, fever 102.5, she feels like something isn't right - VM she did state she was going to [facility]     When I called her back she was at [facility]   Informed her that unfortunately he does not have privileges at [facility] but I will obtain the records   Keep appt. as scheduled for fu   Call if needed, I did paged  to inform him about pt   Reminder set for records fu

## 2024-01-18 ENCOUNTER — TELEPHONE (OUTPATIENT)
Dept: SURGERY | Facility: CLINIC | Age: 59
End: 2024-01-18
Payer: COMMERCIAL

## 2024-01-25 ENCOUNTER — OFFICE VISIT (OUTPATIENT)
Dept: SURGERY | Facility: CLINIC | Age: 59
End: 2024-01-25
Payer: COMMERCIAL

## 2024-01-25 ENCOUNTER — OFFICE VISIT (OUTPATIENT)
Dept: NEPHROLOGY | Facility: CLINIC | Age: 59
End: 2024-01-25
Payer: COMMERCIAL

## 2024-01-25 VITALS
WEIGHT: 177 LBS | HEIGHT: 68 IN | DIASTOLIC BLOOD PRESSURE: 74 MMHG | OXYGEN SATURATION: 95 % | RESPIRATION RATE: 20 BRPM | TEMPERATURE: 97 F | SYSTOLIC BLOOD PRESSURE: 100 MMHG | BODY MASS INDEX: 26.83 KG/M2 | HEART RATE: 117 BPM | BODY MASS INDEX: 26.92 KG/M2 | HEART RATE: 116 BPM | WEIGHT: 177.63 LBS | HEIGHT: 68 IN | DIASTOLIC BLOOD PRESSURE: 71 MMHG | SYSTOLIC BLOOD PRESSURE: 112 MMHG

## 2024-01-25 DIAGNOSIS — N18.6 ESRD (END STAGE RENAL DISEASE) ON DIALYSIS: Primary | ICD-10-CM

## 2024-01-25 DIAGNOSIS — Z48.89 POSTOPERATIVE VISIT: ICD-10-CM

## 2024-01-25 DIAGNOSIS — Z99.2 ESRD (END STAGE RENAL DISEASE) ON DIALYSIS: Primary | ICD-10-CM

## 2024-01-25 DIAGNOSIS — N18.6 CKD (CHRONIC KIDNEY DISEASE) STAGE V REQUIRING CHRONIC DIALYSIS: Primary | ICD-10-CM

## 2024-01-25 DIAGNOSIS — Z99.2 CKD (CHRONIC KIDNEY DISEASE) STAGE V REQUIRING CHRONIC DIALYSIS: Primary | ICD-10-CM

## 2024-01-25 PROCEDURE — 99203 OFFICE O/P NEW LOW 30 MIN: CPT | Mod: S$GLB,,, | Performed by: INTERNAL MEDICINE

## 2024-01-25 PROCEDURE — 3066F NEPHROPATHY DOC TX: CPT | Mod: CPTII,S$GLB,, | Performed by: INTERNAL MEDICINE

## 2024-01-25 PROCEDURE — 99024 POSTOP FOLLOW-UP VISIT: CPT | Mod: ,,, | Performed by: NURSE PRACTITIONER

## 2024-01-25 PROCEDURE — 3074F SYST BP LT 130 MM HG: CPT | Mod: CPTII,,, | Performed by: NURSE PRACTITIONER

## 2024-01-25 PROCEDURE — 1159F MED LIST DOCD IN RCRD: CPT | Mod: CPTII,,, | Performed by: NURSE PRACTITIONER

## 2024-01-25 PROCEDURE — 3078F DIAST BP <80 MM HG: CPT | Mod: CPTII,,, | Performed by: NURSE PRACTITIONER

## 2024-01-25 PROCEDURE — 3078F DIAST BP <80 MM HG: CPT | Mod: CPTII,S$GLB,, | Performed by: INTERNAL MEDICINE

## 2024-01-25 PROCEDURE — 3008F BODY MASS INDEX DOCD: CPT | Mod: CPTII,S$GLB,, | Performed by: INTERNAL MEDICINE

## 2024-01-25 PROCEDURE — 99999 PR PBB SHADOW E&M-EST. PATIENT-LVL V: CPT | Mod: PBBFAC,,, | Performed by: INTERNAL MEDICINE

## 2024-01-25 PROCEDURE — 1159F MED LIST DOCD IN RCRD: CPT | Mod: CPTII,S$GLB,, | Performed by: INTERNAL MEDICINE

## 2024-01-25 PROCEDURE — 3074F SYST BP LT 130 MM HG: CPT | Mod: CPTII,S$GLB,, | Performed by: INTERNAL MEDICINE

## 2024-01-25 RX ORDER — FUROSEMIDE 40 MG/1
1 TABLET ORAL
COMMUNITY
Start: 2024-01-24 | End: 2024-01-25

## 2024-01-25 RX ORDER — VALGANCICLOVIR 450 MG/1
450 TABLET, FILM COATED ORAL EVERY OTHER DAY
COMMUNITY
Start: 2024-01-24 | End: 2024-02-07

## 2024-01-25 NOTE — PROGRESS NOTES
Patient ID: [id number]   Chief Complaint: No chief complaint on file.    HPI:     Sulma Gonzales is a 58 y.o. female here for post op visit after placement on PD cath on 2024. Pt's incision is healing well, no fever/chills/or issues. Dialysis has been flushing cath and will be taking PD cath class next week.     Pt was recently admitted to [facility] on 24 for viral sepsis (+for CMV) and released on 24. Will be following up with PCP post hospital admission.     Patient Care Team:  Yakelin Person Jr. as PCP - General (Internal Medicine)  Julio Lofton MD as Surgeon (General Surgery)  Yakelin Person Jr. (Internal Medicine)  Lebron Singleton MD (Nephrology)  [facility], Cardiology Specialist [facility]   Past Medical History:   Diagnosis Date    Allergy     Iodine    Anemia     Chronic kidney disease on chronic dialysis     Chronic kidney disease, unspecified     E. coli sepsis 2023    with acute renal failure    Encounter for blood transfusion 3033    Hypertension     Menopause     Vertigo     From covid shot      Past Surgical History:   Procedure Laterality Date     SECTION      HYSTERECTOMY      INSERTION, CATHETER, DIALYSIS, PERITONEAL, LAPAROSCOPIC N/A 2024    Procedure: INSERTION, CATHETER, DIALYSIS, PERITONEAL, LAPAROSCOPIC;  Surgeon: Julio Lofton MD;  Location: [facility];  Service: General;  Laterality: N/A;    KIDNEY TRANSPLANT      TUBAL LIGATION        Social History     Tobacco Use    Smoking status: Never    Smokeless tobacco: Never   Substance and Sexual Activity    Alcohol use: Never    Drug use: Never    Sexual activity: Yes     Partners: Male      Current Outpatient Medications   Medication Instructions    allopurinoL (ZYLOPRIM) 300 mg, Oral, Three times weekly    azaTHIOprine (IMURAN) 50 mg, Oral    b complex vitamins capsule 1 capsule, Oral, Every morning    cholecalciferol, vitamin D3, (VITAMIN D3) 50 mcg (2,000 unit) Cap capsule 3 tablets, Oral, Daily     FERROUS SULFATE ORAL 3 tablets, Oral, Daily    potassium chloride SA (K-DUR,KLOR-CON) 20 MEQ tablet 20 mEq, Oral    predniSONE (DELTASONE) 10 mg, Oral, Daily    sodium bicarbonate 650 mg, Oral, 4 times daily    valsartan (DIOVAN) 160 mg, Oral    zinc gluconate 50 mg, Oral, Daily     Review of patient's allergies indicates:   Allergen Reactions    Iodine     Shellfish containing products Rash        Subjective:     12 point review of systems conducted, negative except as stated in the history of present illness. See HPI for details.    Objective:     There were no vitals taken for this visit.  Physical Exam:  General:  Alert and oriented.    Integumentary:  Warm, Dry, Pink. Surgical incisions healing well. PD cath in place without issues, dressing in place as well.   Neurologic:  Alert, Oriented.    Psychiatric:  Cooperative.      Assessment:       ICD-10-CM ICD-9-CM   1. CKD (chronic kidney disease) stage V requiring chronic dialysis  N18.6 585.6    Z99.2 V45.11   2. Postoperative visit  Z48.89 V58.49        Plan:     1. CKD (chronic kidney disease) stage V requiring chronic dialysis    2. Postoperative visit       - f/u PRN  - no lifting greater than 15lbs for 2 weeks  - continue to f/u with nephrology and take PD cath education class   - no submerging PD cath in water of any kind  - continue to look out for any redness, fever, swelling, foul odor, or drainage from site, abd pain, significant nausea or vomiting and if begins please call the office.     Future Appointments   Date Time Provider Department Center   1/25/2024  2:15 PM Sharron Espinal FNP [facility]      DYANA Torres

## 2024-01-25 NOTE — PROGRESS NOTES
[facility]phrology New Referral Office Note    HPI  Sulma Gonzales, 58 y.o. female, presents to office as a new patient.  Patient is status post renal transplant more than 30 years ago she end up with chronic rejection and now end-stage renal disease patient is required to be on hemo for a while currently she is off dialysis she will be starting peritoneal dialysis next week.  She feels fine denies any major complaints at this point she had been however is with CMV and she was hypotensive in the hospital requiring pressors she was in ICU also for a while.  Patient goes to Dr. Joaquin for Infectious Disease and he put her on ganciclovir.  She is currently taking only 50 mg of Imuran a day.  Her blood pressures been running on the lower side and she is currently also on Lasix.  Patient denies chest pain shortness of breath palpitation abdominal pain nausea vomiting at this point.          Medical Diagnoses:   Past Medical History:   Diagnosis Date    Allergy     Iodine    Anemia     Chronic kidney disease on chronic dialysis     Chronic kidney disease, unspecified     E. coli sepsis 2023    with acute renal failure    Encounter for blood transfusion 3033    Hypertension     Menopause     Vertigo     From covid shot     There is no problem list on file for this patient.      Surgical History:   Past Surgical History:   Procedure Laterality Date     SECTION      HYSTERECTOMY      INSERTION, CATHETER, DIALYSIS, PERITONEAL, LAPAROSCOPIC N/A 2024    Procedure: INSERTION, CATHETER, DIALYSIS, PERITONEAL, LAPAROSCOPIC;  Surgeon: Julio Lofton MD;  Location: [facility];  Service: General;  Laterality: N/A;    KIDNEY TRANSPLANT      TUBAL LIGATION         Family History:   Family History   Problem Relation Age of Onset    Kidney failure Mother     Kidney failure Sister     Hypertension Sister     Kidney failure Sister        Social History:   Social History     Tobacco Use    Smoking status: Never     Passive  exposure: Never    Smokeless tobacco: Never   Substance Use Topics    Alcohol use: Never       Allergies:  Review of patient's allergies indicates:   Allergen Reactions    Iodine     Shellfish containing products Rash       Medications:    Current Outpatient Medications:     azaTHIOprine (IMURAN) 50 mg Tab, Take 50 mg by mouth once daily., Disp: , Rfl:     b complex vitamins capsule, Take 1 capsule by mouth every morning., Disp: , Rfl:     cholecalciferol, vitamin D3, (VITAMIN D3) 50 mcg (2,000 unit) Cap capsule, Take 3 tablets by mouth once daily., Disp: , Rfl:     FERROUS SULFATE ORAL, Take 3 tablets by mouth once daily., Disp: , Rfl:     potassium chloride SA (K-DUR,KLOR-CON) 20 MEQ tablet, Take 20 mEq by mouth once daily., Disp: , Rfl:     predniSONE (DELTASONE) 10 MG tablet, Take 10 mg by mouth once daily., Disp: , Rfl:     sodium bicarbonate 650 MG tablet, Take 650 mg by mouth 4 (four) times daily., Disp: , Rfl:     valGANciclovir (VALCYTE) 450 mg Tab, Take 450 mg by mouth every other day., Disp: , Rfl:     zinc gluconate 50 mg tablet, Take 50 mg by mouth once daily., Disp: , Rfl:   No current facility-administered medications for this visit.    Facility-Administered Medications Ordered in Other Visits:     0.9%  NaCl infusion, , Intravenous, Continuous, Julio Lofton MD, Last Rate: 10 mL/hr at 01/02/24 1019, New Bag at 01/02/24 1019    diphenhydrAMINE injection 6.25 mg, 6.25 mg, Intravenous, Once PRN, Erica Bustamante MD    droPERidol injection 0.25 mg, 0.25 mg, Intravenous, Once PRN, Erica Bustamante MD    HYDROmorphone (PF) injection 0.2 mg, 0.2 mg, Intravenous, Q5 Min PRN, Erica Bustamante MD    metoclopramide injection 10 mg, 10 mg, Intravenous, Once PRN, Erica Bustamante MD    oxyCODONE immediate release tablet 5 mg, 5 mg, Oral, Q3H PRN, Erica Bustamante MD       Review of Systems:    Constitutional: Denies fever, fatigue, generalized weakness  Skin: Denies wounds, no rashes, no itching, no new skin  "lesions  Respiratory:  Denies cough, shortness of breath, or wheezing  Cardiovascular: Denies chest pain, palpitations, or swelling  Gastrointestional: Denies abdominal pain, nausea, vomiting, diarrhea, or constipation  Genitourinary: Denies dysuria, hematuria, foamy urine, or incontinence; reports able to empty bladder  Musculoskeletal: Denies back or flank pain  Neurological: Denies headaches, dizziness, paresthesias, tremors or focal weakness      Vital Signs:  /74 (BP Location: Left arm, Patient Position: Sitting)   Pulse (!) 117   Temp 97.2 °F (36.2 °C) (Temporal)   Resp 20   Ht 5' 8" (1.727 m)   Wt 80.3 kg (177 lb)   SpO2 95%   BMI 26.91 kg/m²   Body mass index is 26.91 kg/m².      Physical Exam:    General: no acute distress, awake, alert  Eyes: PERRLA, conjunctiva clear, eyelids without swelling  HENT: atraumatic, oropharynx and nasal mucosa patent  Neck: supple, trache midline, full ROM, no JVD, no thyromegaly or lymphadenopathy  Respiratory: equal, unlabored, clear to auscultation A/P  Cardiovascular: RRR without murmur or rub; radial and pedal pulses +2 BL  Edema: none  Gastrointestinal: soft, non-tender, non-distended; positive bowel sounds; no masses to palpation; no ascites.  PD catheter seen.  No tenderness.  Musculoskeletal: ROM without new limitation or discomfort  Integumentary: warm, dry; no rashes, wounds, or skin lesions  Neurological: oriented x4, appropriate, no acute deficits; no asterixis      Labs:        Component Value Date/Time     01/15/2024 0344     01/14/2024 0309    K 3.7 01/15/2024 0344    K 3.2 (L) 01/14/2024 0309     01/15/2024 0344     01/14/2024 0309    CO2 26 01/15/2024 0344    CO2 21 (L) 01/14/2024 0309    BUN 34 (H) 01/15/2024 0344    BUN 34 (H) 01/14/2024 0309    CREATININE 3.95 (H) 01/15/2024 0344    CREATININE 4.06 (H) 01/14/2024 0309    CREATININE 4.05 (H) 01/09/2024 1713    CREATININE 6.87 (H) 11/27/2023 0645    CALCIUM 8.5 (L) " 01/15/2024 0344    CALCIUM 8.3 (L) 01/14/2024 0309           Component Value Date/Time    WBC 10.6 11/15/2023 0707    HGB 11.3 11/15/2023 0707    HCT 33 (L) 01/02/2024 0926    HCT 34.6 11/15/2023 0707     11/15/2023 0707         Imaging:  Retroperitoneal US:      Impression:    1. ESRD (end stage renal disease) on dialysis        Relative hypotension mild tachycardia  Recent CMV.          Plan:    DC Diovan  DC Lasix  DC allopurinol  Labs next week  I will be seeing her when she gets her PD conditioning in training in the dialysis unit next week    Jennifer Abdalla      This note was created with the assistance of [employer] voice recognition software or phone dictation. There may be transcription errors as a result of using this technology however minimal. Effort has been made to assure accuracy of transcription but any obvious errors or omissions should be clarified with the author of the document.

## 2024-01-29 ENCOUNTER — TELEPHONE (OUTPATIENT)
Dept: NEPHROLOGY | Facility: CLINIC | Age: 59
End: 2024-01-29
Payer: COMMERCIAL

## 2024-01-29 NOTE — TELEPHONE ENCOUNTER
Called Malini at [facility] to let her know that patient did not get any lab work today, Malini stated that she would call patient.

## 2024-02-01 PROCEDURE — 90966 ESRD HOME PT SERV P MO 20+: CPT | Mod: ,,, | Performed by: INTERNAL MEDICINE

## 2024-02-26 ENCOUNTER — OUTSIDE PLACE OF SERVICE (OUTPATIENT)
Dept: NEPHROLOGY | Facility: CLINIC | Age: 59
End: 2024-02-26
Payer: COMMERCIAL

## 2024-03-01 PROCEDURE — 90966 ESRD HOME PT SERV P MO 20+: CPT | Mod: ,,, | Performed by: INTERNAL MEDICINE

## 2024-03-12 ENCOUNTER — HOSPITAL ENCOUNTER (INPATIENT)
Facility: HOSPITAL | Age: 59
LOS: 3 days | Discharge: HOME | DRG: 871 | End: 2024-03-15
Attending: EMERGENCY MEDICINE | Admitting: INTERNAL MEDICINE
Payer: COMMERCIAL

## 2024-03-12 DIAGNOSIS — R53.1 WEAKNESS: ICD-10-CM

## 2024-03-12 DIAGNOSIS — R50.9 FEVER: ICD-10-CM

## 2024-03-12 DIAGNOSIS — N18.6 ESRD (END STAGE RENAL DISEASE): Primary | ICD-10-CM

## 2024-03-12 LAB
ALBUMIN SERPL-MCNC: 3.1 G/DL (ref 3.4–4.8)
ALBUMIN/GLOB SERPL: 1 RATIO (ref 1.1–2)
ALP SERPL-CCNC: 73 UNIT/L (ref 40–150)
ALT SERPL-CCNC: 50 UNIT/L (ref 0–55)
APPEARANCE UR: ABNORMAL
AST SERPL-CCNC: 41 UNIT/L (ref 5–34)
BACTERIA #/AREA URNS AUTO: ABNORMAL /HPF
BASOPHILS # BLD AUTO: 0.04 X10(3)/MCL
BASOPHILS NFR BLD AUTO: 0.2 %
BILIRUB SERPL-MCNC: 0.6 MG/DL
BILIRUB UR QL STRIP.AUTO: NEGATIVE
BUN SERPL-MCNC: 38.1 MG/DL (ref 9.8–20.1)
CALCIUM SERPL-MCNC: 8.3 MG/DL (ref 8.4–10.2)
CHLORIDE SERPL-SCNC: 105 MMOL/L (ref 98–107)
CO2 SERPL-SCNC: 21 MMOL/L (ref 23–31)
COLOR UR AUTO: YELLOW
CORTIS SERPL-SCNC: 15 UG/DL
CREAT SERPL-MCNC: 4.99 MG/DL (ref 0.55–1.02)
EOSINOPHIL # BLD AUTO: 0.06 X10(3)/MCL (ref 0–0.9)
EOSINOPHIL NFR BLD AUTO: 0.3 %
ERYTHROCYTE [DISTWIDTH] IN BLOOD BY AUTOMATED COUNT: 15.2 % (ref 11.5–17)
FLUAV AG UPPER RESP QL IA.RAPID: NOT DETECTED
FLUBV AG UPPER RESP QL IA.RAPID: NOT DETECTED
GFR SERPLBLD CREATININE-BSD FMLA CKD-EPI: 9 MLS/MIN/1.73/M2
GLOBULIN SER-MCNC: 3.2 GM/DL (ref 2.4–3.5)
GLUCOSE SERPL-MCNC: 97 MG/DL (ref 82–115)
GLUCOSE UR QL STRIP.AUTO: NORMAL
GRAM STN SPEC: NORMAL
GRAM STN SPEC: NORMAL
HCT VFR BLD AUTO: 33.4 % (ref 37–47)
HGB BLD-MCNC: 11.5 G/DL (ref 12–16)
IMM GRANULOCYTES # BLD AUTO: 0.09 X10(3)/MCL (ref 0–0.04)
IMM GRANULOCYTES NFR BLD AUTO: 0.5 %
KETONES UR QL STRIP.AUTO: NEGATIVE
LACTATE SERPL-SCNC: 1 MMOL/L (ref 0.5–2.2)
LEUKOCYTE ESTERASE UR QL STRIP.AUTO: 500
LYMPHOCYTE MANUAL BF (OHS): 54 %
LYMPHOCYTES # BLD AUTO: 3.25 X10(3)/MCL (ref 0.6–4.6)
LYMPHOCYTES NFR BLD AUTO: 17.3 %
MACROPHAGES, FLUID MAN COUNT (OHS): 2
MCH RBC QN AUTO: 31.5 PG (ref 27–31)
MCHC RBC AUTO-ENTMCNC: 34.4 G/DL (ref 33–36)
MCV RBC AUTO: 91.5 FL (ref 80–94)
MONOCYTE MANUAL BF (OHS): 10 %
MONOCYTES # BLD AUTO: 1.34 X10(3)/MCL (ref 0.1–1.3)
MONOCYTES NFR BLD AUTO: 7.1 %
MRSA PCR SCRN (OHS): NOT DETECTED
NEUTROPHILS # BLD AUTO: 13.97 X10(3)/MCL (ref 2.1–9.2)
NEUTROPHILS MAN BF (OHS): 36 %
NEUTROPHILS NFR BLD AUTO: 74.6 %
NITRITE UR QL STRIP.AUTO: NEGATIVE
NRBC BLD AUTO-RTO: 0 %
PH UR STRIP.AUTO: 6 [PH]
PLATELET # BLD AUTO: 173 X10(3)/MCL (ref 130–400)
PMV BLD AUTO: 10.7 FL (ref 7.4–10.4)
POTASSIUM SERPL-SCNC: 3.4 MMOL/L (ref 3.5–5.1)
PROT SERPL-MCNC: 6.3 GM/DL (ref 5.8–7.6)
PROT UR QL STRIP.AUTO: ABNORMAL
RBC # BLD AUTO: 3.65 X10(6)/MCL (ref 4.2–5.4)
RBC #/AREA URNS AUTO: ABNORMAL /HPF
RBC UR QL AUTO: ABNORMAL
SARS-COV-2 RNA RESP QL NAA+PROBE: NOT DETECTED
SODIUM SERPL-SCNC: 137 MMOL/L (ref 136–145)
SP GR UR STRIP.AUTO: 1.01 (ref 1–1.03)
SQUAMOUS #/AREA URNS LPF: ABNORMAL /HPF
TROPONIN I SERPL-MCNC: 0.02 NG/ML (ref 0–0.04)
UROBILINOGEN UR STRIP-ACNC: NORMAL
WBC # FLD AUTO: 160 /UL
WBC # SPEC AUTO: 18.75 X10(3)/MCL (ref 4.5–11.5)
WBC #/AREA URNS AUTO: >100 /HPF
WBC CLUMPS UR QL AUTO: ABNORMAL

## 2024-03-12 PROCEDURE — 87641 MR-STAPH DNA AMP PROBE: CPT | Performed by: INTERNAL MEDICINE

## 2024-03-12 PROCEDURE — 85025 COMPLETE CBC W/AUTO DIFF WBC: CPT | Performed by: PHYSICIAN ASSISTANT

## 2024-03-12 PROCEDURE — 81001 URINALYSIS AUTO W/SCOPE: CPT | Performed by: PHYSICIAN ASSISTANT

## 2024-03-12 PROCEDURE — 87205 SMEAR GRAM STAIN: CPT | Performed by: INTERNAL MEDICINE

## 2024-03-12 PROCEDURE — 93005 ELECTROCARDIOGRAM TRACING: CPT

## 2024-03-12 PROCEDURE — 25000003 PHARM REV CODE 250: Performed by: INTERNAL MEDICINE

## 2024-03-12 PROCEDURE — 0240U COVID/FLU A&B PCR: CPT | Performed by: PHYSICIAN ASSISTANT

## 2024-03-12 PROCEDURE — 99285 EMERGENCY DEPT VISIT HI MDM: CPT | Mod: 25

## 2024-03-12 PROCEDURE — 83605 ASSAY OF LACTIC ACID: CPT | Performed by: PHYSICIAN ASSISTANT

## 2024-03-12 PROCEDURE — 25000003 PHARM REV CODE 250: Performed by: PHYSICIAN ASSISTANT

## 2024-03-12 PROCEDURE — 87040 BLOOD CULTURE FOR BACTERIA: CPT | Performed by: PHYSICIAN ASSISTANT

## 2024-03-12 PROCEDURE — 87070 CULTURE OTHR SPECIMN AEROBIC: CPT | Performed by: INTERNAL MEDICINE

## 2024-03-12 PROCEDURE — 90945 DIALYSIS ONE EVALUATION: CPT

## 2024-03-12 PROCEDURE — 25000003 PHARM REV CODE 250: Performed by: EMERGENCY MEDICINE

## 2024-03-12 PROCEDURE — 82533 TOTAL CORTISOL: CPT | Performed by: INTERNAL MEDICINE

## 2024-03-12 PROCEDURE — 89051 BODY FLUID CELL COUNT: CPT | Performed by: INTERNAL MEDICINE

## 2024-03-12 PROCEDURE — 87086 URINE CULTURE/COLONY COUNT: CPT | Performed by: PHYSICIAN ASSISTANT

## 2024-03-12 PROCEDURE — 87340 HEPATITIS B SURFACE AG IA: CPT | Performed by: INTERNAL MEDICINE

## 2024-03-12 PROCEDURE — 63600175 PHARM REV CODE 636 W HCPCS: Performed by: PHYSICIAN ASSISTANT

## 2024-03-12 PROCEDURE — 80053 COMPREHEN METABOLIC PANEL: CPT | Performed by: PHYSICIAN ASSISTANT

## 2024-03-12 PROCEDURE — 63600175 PHARM REV CODE 636 W HCPCS: Performed by: EMERGENCY MEDICINE

## 2024-03-12 PROCEDURE — 63600175 PHARM REV CODE 636 W HCPCS: Performed by: INTERNAL MEDICINE

## 2024-03-12 PROCEDURE — 11000001 HC ACUTE MED/SURG PRIVATE ROOM

## 2024-03-12 PROCEDURE — 84484 ASSAY OF TROPONIN QUANT: CPT | Performed by: PHYSICIAN ASSISTANT

## 2024-03-12 PROCEDURE — 87341 HEP B SURFACE AG NEUTRLZJ IA: CPT | Performed by: INTERNAL MEDICINE

## 2024-03-12 PROCEDURE — 99222 1ST HOSP IP/OBS MODERATE 55: CPT | Mod: ,,, | Performed by: INTERNAL MEDICINE

## 2024-03-12 PROCEDURE — 3E1M39Z IRRIGATION OF PERITONEAL CAVITY USING DIALYSATE, PERCUTANEOUS APPROACH: ICD-10-PCS | Performed by: INTERNAL MEDICINE

## 2024-03-12 RX ORDER — POTASSIUM CHLORIDE 20 MEQ/1
20 TABLET, EXTENDED RELEASE ORAL ONCE
Status: COMPLETED | OUTPATIENT
Start: 2024-03-12 | End: 2024-03-12

## 2024-03-12 RX ORDER — SODIUM CHLORIDE, SODIUM LACTATE, POTASSIUM CHLORIDE, CALCIUM CHLORIDE 600; 310; 30; 20 MG/100ML; MG/100ML; MG/100ML; MG/100ML
INJECTION, SOLUTION INTRAVENOUS CONTINUOUS
Status: DISCONTINUED | OUTPATIENT
Start: 2024-03-12 | End: 2024-03-13

## 2024-03-12 RX ORDER — PREDNISONE 10 MG/1
10 TABLET ORAL DAILY
Status: DISCONTINUED | OUTPATIENT
Start: 2024-03-13 | End: 2024-03-15 | Stop reason: HOSPADM

## 2024-03-12 RX ORDER — ACETAMINOPHEN 500 MG
1000 TABLET ORAL
Status: COMPLETED | OUTPATIENT
Start: 2024-03-12 | End: 2024-03-12

## 2024-03-12 RX ORDER — SODIUM BICARBONATE 650 MG/1
650 TABLET ORAL 3 TIMES DAILY
Status: DISCONTINUED | OUTPATIENT
Start: 2024-03-12 | End: 2024-03-15 | Stop reason: HOSPADM

## 2024-03-12 RX ORDER — MIDODRINE HYDROCHLORIDE 5 MG/1
5 TABLET ORAL 3 TIMES DAILY
Status: DISCONTINUED | OUTPATIENT
Start: 2024-03-12 | End: 2024-03-14

## 2024-03-12 RX ORDER — METOPROLOL TARTRATE 25 MG/1
25 TABLET, FILM COATED ORAL ONCE
COMMUNITY

## 2024-03-12 RX ORDER — ENOXAPARIN SODIUM 100 MG/ML
30 INJECTION SUBCUTANEOUS EVERY 24 HOURS
Status: DISCONTINUED | OUTPATIENT
Start: 2024-03-12 | End: 2024-03-12

## 2024-03-12 RX ORDER — ENOXAPARIN SODIUM 100 MG/ML
30 INJECTION SUBCUTANEOUS EVERY 24 HOURS
Status: DISCONTINUED | OUTPATIENT
Start: 2024-03-12 | End: 2024-03-15 | Stop reason: HOSPADM

## 2024-03-12 RX ORDER — ACETAMINOPHEN 325 MG/1
650 TABLET ORAL EVERY 4 HOURS PRN
Status: DISCONTINUED | OUTPATIENT
Start: 2024-03-12 | End: 2024-03-15 | Stop reason: HOSPADM

## 2024-03-12 RX ORDER — HYDRALAZINE HYDROCHLORIDE 20 MG/ML
10 INJECTION INTRAMUSCULAR; INTRAVENOUS
Status: DISCONTINUED | OUTPATIENT
Start: 2024-03-12 | End: 2024-03-15 | Stop reason: HOSPADM

## 2024-03-12 RX ORDER — ONDANSETRON HYDROCHLORIDE 2 MG/ML
4 INJECTION, SOLUTION INTRAVENOUS EVERY 6 HOURS PRN
Status: DISCONTINUED | OUTPATIENT
Start: 2024-03-12 | End: 2024-03-15 | Stop reason: HOSPADM

## 2024-03-12 RX ADMIN — ACETAMINOPHEN 1000 MG: 500 TABLET ORAL at 01:03

## 2024-03-12 RX ADMIN — SODIUM CHLORIDE 500 ML: 9 INJECTION, SOLUTION INTRAVENOUS at 04:03

## 2024-03-12 RX ADMIN — MIDODRINE HYDROCHLORIDE 5 MG: 5 TABLET ORAL at 09:03

## 2024-03-12 RX ADMIN — VANCOMYCIN HYDROCHLORIDE 2000 MG: 500 INJECTION, POWDER, LYOPHILIZED, FOR SOLUTION INTRAVENOUS at 03:03

## 2024-03-12 RX ADMIN — SODIUM BICARBONATE 650 MG TABLET 650 MG: at 09:03

## 2024-03-12 RX ADMIN — MIDODRINE HYDROCHLORIDE 5 MG: 5 TABLET ORAL at 05:03

## 2024-03-12 RX ADMIN — ENOXAPARIN SODIUM 30 MG: 30 INJECTION SUBCUTANEOUS at 06:03

## 2024-03-12 RX ADMIN — PIPERACILLIN AND TAZOBACTAM 4.5 G: 4; .5 INJECTION, POWDER, LYOPHILIZED, FOR SOLUTION INTRAVENOUS; PARENTERAL at 03:03

## 2024-03-12 RX ADMIN — POTASSIUM CHLORIDE 20 MEQ: 1500 TABLET, EXTENDED RELEASE ORAL at 03:03

## 2024-03-12 RX ADMIN — HYDROCORTISONE SODIUM SUCCINATE 100 MG: 100 INJECTION, POWDER, FOR SOLUTION INTRAMUSCULAR; INTRAVENOUS at 05:03

## 2024-03-12 RX ADMIN — SODIUM CHLORIDE, POTASSIUM CHLORIDE, SODIUM LACTATE AND CALCIUM CHLORIDE: 600; 310; 30; 20 INJECTION, SOLUTION INTRAVENOUS at 06:03

## 2024-03-12 NOTE — PROGRESS NOTES
Pharmacist Renal Dose Adjustment Note    Sulma Gonzales is a 58 y.o. female being treated with the medication enoxaparin    Patient Data:    Vital Signs (Most Recent):  Temp: (!) 103.9 °F (39.9 °C) (03/12/24 1406)  Pulse: (!) 112 (03/12/24 1406)  Resp: (!) 30 (03/12/24 1406)  BP: (!) 144/80 (03/12/24 1406)  SpO2: 100 % (03/12/24 1406) Vital Signs (72h Range):  Temp:  [103 °F (39.4 °C)-103.9 °F (39.9 °C)]   Pulse:  [112-117]   Resp:  [18-30]   BP: (135-144)/(68-80)   SpO2:  [99 %-100 %]      Recent Labs   Lab 03/12/24  1350   CREATININE 4.99*     Serum creatinine: 4.99 mg/dL (H) 03/12/24 1350  Estimated creatinine clearance: 13.1 mL/min (A)    Medication:enoxaparin dose: 40mg frequency daily will be changed to medication:enoxaparin dose:30mg frequency:daily    Pharmacist's Name: Staci Simental  Pharmacist's Extension: [phone]

## 2024-03-12 NOTE — PROGRESS NOTES
"Pharmacokinetic Initial Assessment: IV Vancomycin    Assessment/Plan:    Initiate intravenous vancomycin with loading dose of 2000 mg once with subsequent doses when random concentrations are less than 20 mcg/mL  Desired empiric serum trough concentration is 15 to 20 mcg/mL  Draw vancomycin random level on 03/13 at 0430.  Pharmacy will continue to follow and monitor vancomycin.      Please contact pharmacy at extension [phone] with any questions regarding this assessment.     Thank you for the consult,   Staci Simental       Patient brief summary:  Sulma Gonzales is a 58 y.o. female initiated on antimicrobial therapy with IV Vancomycin for treatment of suspected sepsis    Drug Allergies:   Review of patient's allergies indicates:   Allergen Reactions    Iodine     Shellfish containing products Rash       Actual Body Weight:   80.7 kg    Renal Function:   Estimated Creatinine Clearance: 13.1 mL/min (A) (based on SCr of 4.99 mg/dL (H)).,     Dialysis Method (if applicable):  peritoneal dialysis    CBC (last 72 hours):  Recent Labs   Lab Result Units 03/12/24  1350   WBC x10(3)/mcL 18.75*   Hgb g/dL 11.5*   Hct % 33.4*   Platelet x10(3)/mcL 173   Mono % % 7.1   Eos % % 0.3   Basophil % % 0.2       Metabolic Panel (last 72 hours):  Recent Labs   Lab Result Units 03/12/24  1350   Sodium Level mmol/L 137   Potassium Level mmol/L 3.4*   Chloride mmol/L 105   Carbon Dioxide mmol/L 21*   Glucose Level mg/dL 97   Blood Urea Nitrogen mg/dL 38.1*   Creatinine mg/dL 4.99*   Albumin Level g/dL 3.1*   Bilirubin Total mg/dL 0.6   Alkaline Phosphatase unit/L 73   Aspartate Aminotransferase unit/L 41*   Alanine Aminotransferase unit/L 50       Drug levels (last 3 results):  No results for input(s): "VANCOMYCINRA", "VANCORANDOM", "VANCOMYCINPE", "VANCOPEAK", "VANCOMYCINTR", "VANCOTROUGH" in the last 72 hours.    Microbiologic Results:  Microbiology Results (last 7 days)       Procedure Component Value Units Date/Time    Blood culture #2 " **CANNOT BE ORDERED STAT** [[id number]] Collected: 03/12/24 1421    Order Status: Resulted Specimen: Blood Updated: 03/12/24 1424    Blood culture #1 **CANNOT BE ORDERED STAT** [[id number]] Collected: 03/12/24 1421    Order Status: Resulted Specimen: Blood Updated: 03/12/24 1424    Body Fluid Culture [[id number]]     Order Status: Sent Specimen: Peritoneal Fluid

## 2024-03-12 NOTE — CONSULTS
[facility] [place]    Patient Name: Sulma Gonzales  Admission Date: 3/12/2024  Hospital Length of Stay: 0 days  Code Status: No Order   Attending Physician: Lolis Medina MD   Primary Care Physician: Yakelin Person Jr.  Principal Problem:<principal problem not specified>    HPI:    Sulma Gonzales 58 y.o. female presented to the hospital on 3/12/2024. Pt is SP renal transplant with rejection, recently started on PD  She still has acceptable residual renal function  Around 2 months ago she was admitted to [facility] with sepsis and CMV viremia.  She was seen by Dr Carmona and started on Gancyclovir  Pt for the past couple of days has been having increase temperatures >101  with generalized malaise  No back pain  No NV  No abd pain  PD fluid coming clear  No  symptoms      We have been consulted for dialysis and renal managment        Medical History:   Past Medical History:   Diagnosis Date    Allergy     Iodine    Anemia     Chronic kidney disease on chronic dialysis     Chronic kidney disease, unspecified     E. coli sepsis 2023    with acute renal failure    Encounter for blood transfusion 3033    Hypertension     Menopause     Vertigo     From covid shot       Surgical History:   Past Surgical History:   Procedure Laterality Date     SECTION      HYSTERECTOMY      INSERTION, CATHETER, DIALYSIS, PERITONEAL, LAPAROSCOPIC N/A 2024    Procedure: INSERTION, CATHETER, DIALYSIS, PERITONEAL, LAPAROSCOPIC;  Surgeon: Julio Lofton MD;  Location: [facility];  Service: General;  Laterality: N/A;    KIDNEY TRANSPLANT      TUBAL LIGATION         Family History:   Family History   Problem Relation Age of Onset    Kidney failure Mother     Kidney failure Sister     Hypertension Sister     Kidney failure Sister    .     Social History:   Social History     Tobacco Use    Smoking status: Never     Passive exposure: Never    Smokeless tobacco: Never   Substance Use Topics    Alcohol use: Never        Allergies:  Review of patient's allergies indicates:   Allergen Reactions    Iodine     Shellfish containing products Rash         Review of Systems:  Constitutional: ++fever  Skin: Denies wounds, rashes, no skin lesions or itching  EENT: Denies acute hearing/vision changes, tinnitus, rhinorrhea or dysphagia  Respiratory:  Denies cough, shortness of breath, or wheezing  Cardiovascular: Denies chest pain, palpitations, or swelling  Gastrointestional: Denies abdominal pain, nausea, vomiting, diarrhea or constipation  Genitourinary: Denies dysuria, hematuria, foamy urine or incontinence; able to empty bladder  Musculoskeletal: Denies myalgias, back pain, flank pain, new decreased ROM or acute focal weakness  Neurological: Denies headaches, seizures, paresthesias, dizziness or asterixis  Hematological: Denies unusual bruising or bleeding  Psychiatric: Denies psychiatric concerns, hallucinations, or depression; no confusion    Medications:    Current Facility-Administered Medications:     acetaminophen tablet 650 mg, 650 mg, Oral, Q4H PRN, Margot Esparza PA-C    enoxaparin injection 30 mg, 30 mg, Subcutaneous, Daily, Margot Esparza PA-C    hydrALAZINE injection 10 mg, 10 mg, Intravenous, Q2H PRN, Margot Esparza PA-C    ondansetron injection 4 mg, 4 mg, Intravenous, Q6H PRN, Margot Esparza PA-C    piperacillin-tazobactam (ZOSYN) 4.5 g in dextrose 5 % in water (D5W) 100 mL IVPB (MB+), 4.5 g, Intravenous, ED 1 Time, Duong Gaytan MD    [START ON 3/13/2024] piperacillin-tazobactam (ZOSYN) 4.5 g in dextrose 5 % in water (D5W) 100 mL IVPB (MB+), 4.5 g, Intravenous, Q12H, Margot Esparza PA-C    Pharmacy to dose Vancomycin consult, , , Once **AND** vancomycin - pharmacy to dose, , Intravenous, pharmacy to manage frequency, Margot Esparza PA-C    vancomycin 2 g in dextrose 5 % 500 mL IVPB, 2,000 mg, Intravenous, ED 1 Time, Duong Gaytan MD    Current Outpatient Medications:      "azaTHIOprine (IMURAN) 50 mg Tab, Take 50 mg by mouth once daily., Disp: , Rfl:     b complex vitamins capsule, Take 1 capsule by mouth every morning., Disp: , Rfl:     cholecalciferol, vitamin D3, (VITAMIN D3) 50 mcg (2,000 unit) Cap capsule, Take 3 tablets by mouth once daily., Disp: , Rfl:     FERROUS SULFATE ORAL, Take 3 tablets by mouth once daily., Disp: , Rfl:     potassium chloride SA (K-DUR,KLOR-CON) 20 MEQ tablet, Take 20 mEq by mouth once daily., Disp: , Rfl:     predniSONE (DELTASONE) 10 MG tablet, Take 10 mg by mouth once daily., Disp: , Rfl:     sodium bicarbonate 650 MG tablet, Take 650 mg by mouth 4 (four) times daily., Disp: , Rfl:     zinc gluconate 50 mg tablet, Take 50 mg by mouth once daily., Disp: , Rfl:     Facility-Administered Medications Ordered in Other Encounters:     0.9%  NaCl infusion, , Intravenous, Continuous, Julio Lofton MD, Last Rate: 10 mL/hr at 01/02/24 1019, New Bag at 01/02/24 1019    diphenhydrAMINE injection 6.25 mg, 6.25 mg, Intravenous, Once PRN, Erica Bustamante MD    droPERidol injection 0.25 mg, 0.25 mg, Intravenous, Once PRN, Erica Bustamante MD    HYDROmorphone (PF) injection 0.2 mg, 0.2 mg, Intravenous, Q5 Min PRN, Erica Bustamante MD    metoclopramide injection 10 mg, 10 mg, Intravenous, Once PRN, Erica Bustamante MD    oxyCODONE immediate release tablet 5 mg, 5 mg, Oral, Q3H PRN, Erica Bustamante MD     Scheduled Meds:   enoxaparin  30 mg Subcutaneous Daily    piperacillin-tazobactam (Zosyn) IV (PEDS and ADULTS) (extended infusion is not appropriate)  4.5 g Intravenous ED 1 Time    [START ON 3/13/2024] piperacillin-tazobactam (Zosyn) IV (PEDS and ADULTS) (extended infusion is not appropriate)  4.5 g Intravenous Q12H    vancomycin (VANCOCIN) IV (PEDS and ADULTS)  2,000 mg Intravenous ED 1 Time     Continuous Infusions:      Objective:  BP (!) 144/80   Pulse (!) 112   Temp (!) 103.9 °F (39.9 °C) (Oral)   Resp (!) 30   Ht 5' 7" (1.702 m)   Wt 80.7 kg (178 lb)  " " SpO2 100%   BMI 27.88 kg/m²  Body mass index is 27.88 kg/m².    No intake/output data recorded.  No intake/output data recorded.        Physical Exam:  General: no acute distress, awake, alert  Eyes: PERRLA, EOMI, conjunctiva clear, eyelids without swelling  HENT: atraumatic, oropharynx and nasal mucosa patent, no difficulty hearing  Neck: full ROM, no JVD, no thyromegaly or lymphadenopathy  Respiratory: equal, unlabored, clear to auscultation A/P  Cardiovascular: RRR without  rub; BL radial and pedal pulses felt  Edema: none  Gastrointestinal: soft, non-tender, non-distended; positive bowel sounds; no masses to palpation  Genitourinary: no CVA tenderness upon palpation  Musculoskeletal: full ROM without limitation or discomfort  Integumentary: warm, dry; no rashes, wounds, or skin lesions  Neurological: oriented, appropriate, no acute deficits  Dialysis access:  PD cath site OK    Labs:  Chemistries:   Recent Labs   Lab 03/12/24  1350      K 3.4*   CO2 21*   BUN 38.1*   CREATININE 4.99*   CALCIUM 8.3*   BILITOT 0.6   ALKPHOS 73   ALT 50   AST 41*   GLUCOSE 97        CBC/Anemia Labs: Coags:    Recent Labs   Lab 03/12/24  1350   WBC 18.75*   HGB 11.5*   HCT 33.4*      MCV 91.5   RDW 15.2    No results for input(s): "PT", "INR", "APTT" in the last 168 hours.       Impression:    ESRD on PD  Immune-suppressed status   Fever/leukocytosis concerning for bacteremia  No clinical suggestion of peritonitis, but still need to Rule it out      Plan:  Will continue PD   Check PD fluid cell count and cultures  Agree with Nicole cultures  Agree with VANC/zosyn for now pnd ID consult  Will check CMV PCR  Will follow       Jennifer Abdalla      Thank you for this consult.   "

## 2024-03-12 NOTE — Clinical Note
Diagnosis: Fever [100415]   Future Attending Provider: MAYLIN DE SANTIAGO [[id number]]   Admit to which facility:: [facility] [[id number]]   Reason for IP Medical Treatment  (Clinical interventions that can only be accomplished in the IP setting? ) :: inpatient services necessary   Estimated Length of Stay:: 2 midnights   I certify that Inpatient services for greater than or equal to 2 midnights are medically necessary:: Yes   Plans for Post-Acute care--if anticipated (pick the single best option):: A. No post acute care anticipated at this time

## 2024-03-12 NOTE — H&P
[facility] Hospital Medicine History & Physical Examination       Patient Name: Sulma Gonzales  MRN: [id number]  Patient Class: IP- Inpatient   Admission Date: 3/12/2024   Admitting Physician: Lolis Medina MD   Length of Stay: 0  Attending Physician: Lolis Medina MD   Primary Care Provider: Yakelin Person Jr.  Face-to-Face encounter date: 03/12/2024  Code Status: Full Code    Chief Complaint: Abnormal Lab (Pt instructed by Dr. HOOD w/ ID to come to ED for blood cultures d/t high WBC count. Pt reports 103F fever last night and weakness today. On immunodepressant for kidney transplant 20y ago.)        Patient information was obtained from patient, patient's family, past medical records and ER records.     MD addendum   57 yo female with h/o ESRD remote h/o kidney transplant with  rejection and recently started on PD, recent CMV/E.coli septicemia admitted to [facility] in 1/2024 and followed by Dr. Gandara since,  was asked to come to the ED by Dr. Gandara for further evaluation of fever at home and leukocytosis noted on an outside labs . In the ED, pt was febrile , hypotensive , tachycardiac and tachypnic. Pt denies abd pain or PD site tenderness and cloudy PD fluid. Pt became hypotensive in the ED required bolus IVF. Hydrocortisone stress dose 100 mg IV x 1 dose given as pt is on chronic prednisone treatment. Vancomycin and Zosyn initiated. Gentle hydration with midodrine for BP support started. Nephrology and ID consulted to assist.     A/P   Sepsis due to unspecified organism - source of infection likely peritonitis or UTI, POA  Leukocytosis due to infection   Normocytic anemia   Hypokalemia , POA    Plan-   Monitor hemodynamics   Follow up cultures   Continue Zosyn and Vancomycin  ID and Nephrology were consulted     Critical care note:  Critical care diagnosis: sepsis requiring bolus fluid and IV antibiotic   Critical care interventions: Hands-on evaluation, review of  labs/radiographs/records and discussion with patient and family if present  Critical care time spent: 35 minutes            HISTORY OF PRESENT ILLNESS:   Sulma Gonzales is a 58 y.o. 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 The patient presented to [facility] on 3/12/2024 with a primary complaint of cultures per Dr. Carmona, Infectious Disease, request as patient was having abdominal pain at PD site.  Patient reports associated symptoms of fever of 103.5° F yesterday, vomiting and burning with urination on 03/10/2024.  She denies complaints of chest pain, shortness of breath, nausea, diarrhea, cough and cloudy fluid with peritoneal dialysis.  Patient reports she was follow with Dr. Carmona for history of bacteremia and CMV.  Her nephrologist is Dr. Abdalla.    Upon presentation to the ED, temperature 103° F, heart rate 117, blood pressure 135/68, respiratory rate 18 and SpO2 99% on room air.  Labs with WBC 18.75, H&H 11.5/32.4, potassium 3.4, BUN/creatinine 38.1/4.99, troponin 0.023, lactic acid 1.0.  Influenza A/B and SARS-COV-2 PCR negative.  UA with trace squamous epithelial cells, trace bacteria, greater than 100 WBCs, 6-10 RBCs, 500 leukocyte esterase, 2+ occult blood, 1+ protein and many white blood cell clumps.  EKG sinus tachycardia ventricular rate of 114 and nonspecific ST abnormalities.  Chest x-ray no acute cardiopulmonary process.  In ED patient received Tylenol, Zosyn.  She is admitted to hospital medicine services for further medical management.    PAST MEDICAL HISTORY:     Past Medical History:   Diagnosis Date    Allergy     Iodine    Anemia     Chronic kidney disease on chronic dialysis     Chronic kidney disease, unspecified     E. coli sepsis 11/2023    with acute renal failure    Encounter for  blood transfusion 3033    Hypertension     Menopause     Vertigo     From covid shot       PAST SURGICAL HISTORY:     Past Surgical History:   Procedure Laterality Date     SECTION      HYSTERECTOMY      INSERTION, CATHETER, DIALYSIS, PERITONEAL, LAPAROSCOPIC N/A 2024    Procedure: INSERTION, CATHETER, DIALYSIS, PERITONEAL, LAPAROSCOPIC;  Surgeon: Julio Lofton MD;  Location: [facility];  Service: General;  Laterality: N/A;    KIDNEY TRANSPLANT      TUBAL LIGATION         ALLERGIES:   Iodine and Shellfish containing products    FAMILY HISTORY:   Mother: ESRD    SOCIAL HISTORY:   Denies tobacco, alcohol and illicit drug use    HOME MEDICATIONS:     Prior to Admission medications    Medication Sig Start Date End Date Taking? Authorizing Provider   azaTHIOprine (IMURAN) 50 mg Tab Take 50 mg by mouth once daily. 23   Provider, Historical   b complex vitamins capsule Take 1 capsule by mouth every morning.    Provider, Historical   cholecalciferol, vitamin D3, (VITAMIN D3) 50 mcg (2,000 unit) Cap capsule Take 3 tablets by mouth once daily.    Provider, Historical   FERROUS SULFATE ORAL Take 3 tablets by mouth once daily.    Provider, Historical   potassium chloride SA (K-DUR,KLOR-CON) 20 MEQ tablet Take 20 mEq by mouth once daily.    Provider, Historical   predniSONE (DELTASONE) 10 MG tablet Take 10 mg by mouth once daily. 23   Provider, Historical   sodium bicarbonate 650 MG tablet Take 650 mg by mouth 4 (four) times daily. 23  Provider, Historical   zinc gluconate 50 mg tablet Take 50 mg by mouth once daily.    Provider, Historical       REVIEW OF SYSTEMS:   Except as documented, all other systems reviewed and negative     PHYSICAL EXAM:     VITAL SIGNS: 24 HRS MIN & MAX LAST   Temp  Min: 103 °F (39.4 °C)  Max: 103.9 °F (39.9 °C) (!) 103.9 °F (39.9 °C)   BP  Min: 135/68  Max: 144/80 (!) 144/80   Pulse  Min: 112  Max: 117  (!) 112   Resp  Min: 18  Max: 30 (!) 30   SpO2  Min: 99 %   Max: 100 % 100 %       General appearance: Well-developed, well-nourished female in no apparent distress.  No family at bedside.  HEENT: Atraumatic head. Moist mucous membranes of oral cavity.  Lungs: Clear to auscultation bilaterally.   Heart: Regular rate and rhythm.   Abdomen: Soft, non-distended, non-tender. Bowel sounds are normal.  PD catheter to left abdomen.  Extremities: No cyanosis, clubbing. No deformities.  Skin: No Rash. Warm and dry.  Neuro: Awake, alert and oriented. Motor and sensory exams grossly intact.  Psych/mental status: Appropriate mood and affect. Cooperative. Responds appropriately to questions.       LABS AND IMAGING:     Recent Labs   Lab 03/12/24  1350   WBC 18.75*   RBC 3.65*   HGB 11.5*   HCT 33.4*   MCV 91.5   MCH 31.5*   MCHC 34.4   RDW 15.2      MPV 10.7*       Recent Labs   Lab 03/12/24  1350      K 3.4*   CO2 21*   BUN 38.1*   CREATININE 4.99*   CALCIUM 8.3*   ALBUMIN 3.1*   ALKPHOS 73   ALT 50   AST 41*   BILITOT 0.6       Microbiology Results (last 7 days)       Procedure Component Value Units Date/Time    Urine Culture High Risk [[id number]]     Order Status: Sent Specimen: Urine, Clean Catch     Blood Culture [[id number]]     Order Status: Sent Specimen: Blood     Blood Culture [[id number]]     Order Status: Sent Specimen: Blood     Body Fluid Culture [[id number]]     Order Status: Sent Specimen: Body Fluid from Peritoneal Fluid     Gram Stain [[id number]]     Order Status: Sent Specimen: Body Fluid from Peritoneal Fluid     Body Fluid Culture [[id number]] Collected: 03/12/24 1502    Order Status: Sent Specimen: Peritoneal Fluid Updated: 03/12/24 1511    Blood culture #2 **CANNOT BE ORDERED STAT** [[id number]] Collected: 03/12/24 1421    Order Status: Resulted Specimen: Blood Updated: 03/12/24 1424    Blood culture #1 **CANNOT BE ORDERED STAT** [[id number]] Collected: 03/12/24 1421    Order Status: Resulted Specimen: Blood Updated: 03/12/24 1424             X-Ray  Chest PA And Lateral  Narrative: EXAMINATION:  XR CHEST PA AND LATERAL    CLINICAL HISTORY:  Fever, unspecified    TECHNIQUE:  Two views of the chest    COMPARISON:  No prior imaging available for comparison.    FINDINGS:  No focal opacification, pleural effusion, or pneumothorax.    The cardiomediastinal silhouette is within normal limits.    No acute osseous abnormality.  Impression: No acute cardiopulmonary process.    Electronically signed by: Micah Ritter  Date:    03/12/2024  Time:    14:41        ASSESSMENT & PLAN:   Assessment:  Sepsis, ?  Spontaneous bacterial peritonitis  ESRD on peritoneal dialysis   Normocytic anemia, stable   Hypokalemia   History of hypertension, ESRD status post failed renal transplant     Plan:  - Continue with vancomycin and Zosyn   - Follow results of blood, urine and peritoneal fluid cultures  - Infectious Disease consulted.  Appreciate recommendations   - Nephrology consulted for peritoneal dialysis.  Appreciate recommendations  - Patient is becoming hypotensive with most recent blood pressure 76/47.  Attending alert for further recommendation  - Resume appropriate home medications when deemed necessary   - Labs in AM      VTE Prophylaxis: will be placed on Lovenox for DVT prophylaxis and will be advised to be as mobile as possible and sit in a chair as tolerated      __________________________________________________________________________  INPATIENT LIST OF MEDICATIONS     Current Facility-Administered Medications:     acetaminophen tablet 650 mg, 650 mg, Oral, Q4H PRN, Margot Esparza, PA-QUITA    enoxaparin injection 30 mg, 30 mg, Subcutaneous, Daily, Margot Esparza, PA-QUITA    hydrALAZINE injection 10 mg, 10 mg, Intravenous, Q2H PRN, Margot Esparza, PA-QUITA    ondansetron injection 4 mg, 4 mg, Intravenous, Q6H PRN, Margot Esparza, PA-QUITA    piperacillin-tazobactam (ZOSYN) 4.5 g in dextrose 5 % in water (D5W) 100 mL IVPB (MB+), 4.5 g, Intravenous, ED 1 Time, Lucila  Duong LYN MD, Last Rate: 200 mL/hr at 03/12/24 1518, 4.5 g at 03/12/24 1518    [START ON 3/13/2024] piperacillin-tazobactam (ZOSYN) 4.5 g in dextrose 5 % in water (D5W) 100 mL IVPB (MB+), 4.5 g, Intravenous, Q12H, Margot Esparza PA-C    Pharmacy to dose Vancomycin consult, , , Once **AND** vancomycin - pharmacy to dose, , Intravenous, pharmacy to manage frequency, Margot Esparza PA-C    vancomycin 2 g in dextrose 5 % 500 mL IVPB, 2,000 mg, Intravenous, ED 1 Time, Duong Gaytan MD    Current Outpatient Medications:     azaTHIOprine (IMURAN) 50 mg Tab, Take 50 mg by mouth once daily., Disp: , Rfl:     b complex vitamins capsule, Take 1 capsule by mouth every morning., Disp: , Rfl:     cholecalciferol, vitamin D3, (VITAMIN D3) 50 mcg (2,000 unit) Cap capsule, Take 3 tablets by mouth once daily., Disp: , Rfl:     FERROUS SULFATE ORAL, Take 3 tablets by mouth once daily., Disp: , Rfl:     potassium chloride SA (K-DUR,KLOR-CON) 20 MEQ tablet, Take 20 mEq by mouth once daily., Disp: , Rfl:     predniSONE (DELTASONE) 10 MG tablet, Take 10 mg by mouth once daily., Disp: , Rfl:     sodium bicarbonate 650 MG tablet, Take 650 mg by mouth 4 (four) times daily., Disp: , Rfl:     zinc gluconate 50 mg tablet, Take 50 mg by mouth once daily., Disp: , Rfl:     Facility-Administered Medications Ordered in Other Encounters:     0.9%  NaCl infusion, , Intravenous, Continuous, Julio Lofton MD, Last Rate: 10 mL/hr at 01/02/24 1019, New Bag at 01/02/24 1019    diphenhydrAMINE injection 6.25 mg, 6.25 mg, Intravenous, Once PRN, Erica Bustamante MD    droPERidol injection 0.25 mg, 0.25 mg, Intravenous, Once PRN, Erica Bustamante MD    HYDROmorphone (PF) injection 0.2 mg, 0.2 mg, Intravenous, Q5 Min PRN, Erica Bustamante MD    metoclopramide injection 10 mg, 10 mg, Intravenous, Once PRN, Erica Bustamante MD    oxyCODONE immediate release tablet 5 mg, 5 mg, Oral, Q3H PRN, Erica Bustamante MD      Scheduled Meds:    enoxaparin  30 mg Subcutaneous Daily    piperacillin-tazobactam (Zosyn) IV (PEDS and ADULTS) (extended infusion is not appropriate)  4.5 g Intravenous ED 1 Time    [START ON 3/13/2024] piperacillin-tazobactam (Zosyn) IV (PEDS and ADULTS) (extended infusion is not appropriate)  4.5 g Intravenous Q12H    vancomycin (VANCOCIN) IV (PEDS and ADULTS)  2,000 mg Intravenous ED 1 Time     Continuous Infusions:  PRN Meds:.acetaminophen, hydrALAZINE, ondansetron, Pharmacy to dose Vancomycin consult **AND** vancomycin - pharmacy to dose      Discharge Planning and Disposition: Anticipated discharge to be determined.    IMargot PA, have reviewed and discussed the case with Dr. Lolis Medina MD    Please see the following addendum for further assessment and plan from there attending MD.    Margot Esparza PA-C  03/12/2024

## 2024-03-12 NOTE — PROGRESS NOTES
Pharmacist Renal Dose Adjustment Note    Sulma Gonzales is a 58 y.o. female being treated with the medication zosyn    Patient Data:    Vital Signs (Most Recent):  Temp: (!) 103.9 °F (39.9 °C) (03/12/24 1406)  Pulse: (!) 112 (03/12/24 1406)  Resp: (!) 30 (03/12/24 1406)  BP: (!) 144/80 (03/12/24 1406)  SpO2: 100 % (03/12/24 1406) Vital Signs (72h Range):  Temp:  [103 °F (39.4 °C)-103.9 °F (39.9 °C)]   Pulse:  [112-117]   Resp:  [18-30]   BP: (135-144)/(68-80)   SpO2:  [99 %-100 %]      Recent Labs   Lab 03/12/24  1350   CREATININE 4.99*     Serum creatinine: 4.99 mg/dL (H) 03/12/24 1350  Estimated creatinine clearance: 13.1 mL/min (A)    Medication:zosyn dose: 3.375g frequency q8h will be changed to medication:zosyn dose:4.5g frequency:q12h    Pharmacist's Name: Staci Simental  Pharmacist's Extension: [phone]

## 2024-03-12 NOTE — ED PROVIDER NOTES
Encounter Date: 3/12/2024       History     Chief Complaint   Patient presents with    Abnormal Lab     Pt instructed by Dr. HOOD w/ ID to come to ED for blood cultures d/t high WBC count. Pt reports 103F fever last night and weakness today. On immunodepressant for kidney transplant 20y ago.     This is a 58-year-old female who has a history of remote kidney transplant 30 years ago and subsequent rejection and she is now back on peritoneal dialysis.  Patient sees Dr. Abdalla as her nephrologist and sees Dr. Rowe as her primary care doctor.    Patient has a history of CMV and E coli septicemia.  Patient presents today with fever she was told by her Infectious Disease doctor Dr. HOOD that she needs to come to the emergency department for cultures.    Patient denies any abdominal pain or cloudy effluent from her PD catheter.  She denies any cough or urinary difficulty.  She really denies any focal complaints except for the fever.    Nephrology was consulted and recommended they will see the patient and put in orders and the dialysis nurse was consulted to obtain PD cultures.  Antibiotics will be held until PD cultures were obtained.  Blood cultures were also obtained and the patient was given Tylenol.  Conservative fluid management given the patient's history of end-stage renal disease and lack of hypotension      Review of patient's allergies indicates:   Allergen Reactions    Iodine     Shellfish containing products Rash     Past Medical History:   Diagnosis Date    Allergy     Iodine    Anemia     Chronic kidney disease on chronic dialysis     Chronic kidney disease, unspecified     E. coli sepsis 2023    with acute renal failure    Encounter for blood transfusion 3033    Hypertension     Menopause     Vertigo     From covid shot     Past Surgical History:   Procedure Laterality Date     SECTION      HYSTERECTOMY      INSERTION, CATHETER, DIALYSIS, PERITONEAL, LAPAROSCOPIC N/A 2024    Procedure:  INSERTION, CATHETER, DIALYSIS, PERITONEAL, LAPAROSCOPIC;  Surgeon: Julio Lofton MD;  Location: [facility];  Service: General;  Laterality: N/A;    KIDNEY TRANSPLANT      TUBAL LIGATION  1987     Family History   Problem Relation Age of Onset    Kidney failure Mother     Kidney failure Sister     Hypertension Sister     Kidney failure Sister      Social History     Tobacco Use    Smoking status: Never     Passive exposure: Never    Smokeless tobacco: Never   Substance Use Topics    Alcohol use: Never    Drug use: Never     Review of Systems   Constitutional:  Positive for fever. Negative for chills, diaphoresis and fatigue.   HENT:  Negative for congestion and trouble swallowing.    Eyes:  Negative for pain and discharge.   Respiratory:  Negative for cough and wheezing.    Cardiovascular:  Negative for chest pain and leg swelling.   Gastrointestinal:  Negative for abdominal pain, diarrhea, nausea and vomiting.   Genitourinary:  Negative for dysuria, flank pain, vaginal bleeding and vaginal discharge.   Musculoskeletal:  Negative for arthralgias.   Skin:  Negative for color change.   Neurological:  Negative for syncope, speech difficulty and weakness.   Psychiatric/Behavioral:  Negative for agitation and confusion.    All other systems reviewed and are negative.      Physical Exam     Initial Vitals [03/12/24 1331]   BP Pulse Resp Temp SpO2   135/68 (!) 117 18 (!) 103 °F (39.4 °C) 99 %      MAP       --         Physical Exam    HENT:   Head: Normocephalic.   Eyes: EOM are normal. Left eye exhibits no discharge. No scleral icterus.   Cardiovascular:  Regular rhythm.           Pulmonary/Chest: No stridor. No respiratory distress.   Abdominal: She exhibits no distension.   PD catheter no redness around the site no abdominal tenderness   Musculoskeletal:         General: Normal range of motion.     Neurological: She is alert and oriented to person, place, and time. She has normal strength.   Skin: Skin is dry. No rash  noted. No erythema. No pallor.   Psychiatric: She has a normal mood and affect. Her behavior is normal. Judgment and thought content normal.         ED Course   Procedures  Labs Reviewed   COMPREHENSIVE METABOLIC PANEL - Abnormal; Notable for the following components:       Result Value    Potassium Level 3.4 (*)     Carbon Dioxide 21 (*)     Blood Urea Nitrogen 38.1 (*)     Creatinine 4.99 (*)     Calcium Level Total 8.3 (*)     Albumin Level 3.1 (*)     Albumin/Globulin Ratio 1.0 (*)     Aspartate Aminotransferase 41 (*)     All other components within normal limits   CBC WITH DIFFERENTIAL - Abnormal; Notable for the following components:    WBC 18.75 (*)     RBC 3.65 (*)     Hgb 11.5 (*)     Hct 33.4 (*)     MCH 31.5 (*)     MPV 10.7 (*)     Neut # 13.97 (*)     Mono # 1.34 (*)     IG# 0.09 (*)     All other components within normal limits   TROPONIN I - Normal   BLOOD CULTURE [facility]   BLOOD CULTURE [facility]   BODY FLUID CULTURE [facility]   CBC W/ AUTO DIFFERENTIAL    Narrative:     The following orders were created for panel order CBC auto differential.  Procedure                               Abnormality         Status                     ---------                               -----------         ------                     CBC with Differential[[id number]]       Abnormal            Final result                 Please view results for these tests on the individual orders.   COVID/FLU A&B PCR   URINALYSIS, REFLEX TO URINE CULTURE   LACTIC ACID, PLASMA          Imaging Results              X-Ray Chest PA And Lateral (In process)  Result time 03/12/24 14:41:06      Wet Read by Duong Gaytan MD (03/12/24 14:40:51, [facility] - Emergency Dept, Emergency Medicine)    Neg                                       Medications   piperacillin-tazobactam (ZOSYN) 4.5 g in dextrose 5 % in water (D5W) 100 mL IVPB (MB+) (has no administration in time range)   vancomycin 2 g in dextrose 5 % 500 mL IVPB (has no  administration in time range)   acetaminophen tablet 1,000 mg (1,000 mg Oral Given 3/12/24 5069)     Medical Decision Making  Amount and/or Complexity of Data Reviewed  Labs: ordered.  Radiology: independent interpretation performed.    Risk  Prescription drug management.  Decision regarding hospitalization.                                      Clinical Impression:  Final diagnoses:  [R53.1] Weakness  [R50.9] Fever  [N18.6] ESRD (end stage renal disease) (Primary)          ED Disposition Condition    Admit Stable                Duong Gaytan MD  03/12/24 6770

## 2024-03-13 LAB
ALBUMIN SERPL-MCNC: 2.6 G/DL (ref 3.4–4.8)
ALBUMIN/GLOB SERPL: 0.9 RATIO (ref 1.1–2)
ALP SERPL-CCNC: 65 UNIT/L (ref 40–150)
ALT SERPL-CCNC: 80 UNIT/L (ref 0–55)
AST SERPL-CCNC: 62 UNIT/L (ref 5–34)
BASOPHILS # BLD AUTO: 0.04 X10(3)/MCL
BASOPHILS NFR BLD AUTO: 0.2 %
BILIRUB SERPL-MCNC: 0.5 MG/DL
BUN SERPL-MCNC: 39.7 MG/DL (ref 9.8–20.1)
CALCIUM SERPL-MCNC: 8.4 MG/DL (ref 8.4–10.2)
CHLORIDE SERPL-SCNC: 105 MMOL/L (ref 98–107)
CMV DNA SERPL NAA+PROBE-ACNC: NORMAL IU/ML
CO2 SERPL-SCNC: 22 MMOL/L (ref 23–31)
CREAT SERPL-MCNC: 5.09 MG/DL (ref 0.55–1.02)
EOSINOPHIL # BLD AUTO: 0.02 X10(3)/MCL (ref 0–0.9)
EOSINOPHIL NFR BLD AUTO: 0.1 %
ERYTHROCYTE [DISTWIDTH] IN BLOOD BY AUTOMATED COUNT: 15.3 % (ref 11.5–17)
GFR SERPLBLD CREATININE-BSD FMLA CKD-EPI: 9 MLS/MIN/1.73/M2
GLOBULIN SER-MCNC: 3 GM/DL (ref 2.4–3.5)
GLUCOSE SERPL-MCNC: 120 MG/DL (ref 82–115)
HBV SURFACE AG SERPL QL IA: REACTIVE
HBV SURFACE AG SERPLBLD QL IA.RAPID: NORMAL
HCT VFR BLD AUTO: 30.9 % (ref 37–47)
HGB BLD-MCNC: 10.4 G/DL (ref 12–16)
IMM GRANULOCYTES # BLD AUTO: 0.08 X10(3)/MCL (ref 0–0.04)
IMM GRANULOCYTES NFR BLD AUTO: 0.5 %
LYMPHOCYTES # BLD AUTO: 1.95 X10(3)/MCL (ref 0.6–4.6)
LYMPHOCYTES NFR BLD AUTO: 12 %
MCH RBC QN AUTO: 31 PG (ref 27–31)
MCHC RBC AUTO-ENTMCNC: 33.7 G/DL (ref 33–36)
MCV RBC AUTO: 92 FL (ref 80–94)
MONOCYTES # BLD AUTO: 0.92 X10(3)/MCL (ref 0.1–1.3)
MONOCYTES NFR BLD AUTO: 5.6 %
NEUTROPHILS # BLD AUTO: 13.29 X10(3)/MCL (ref 2.1–9.2)
NEUTROPHILS NFR BLD AUTO: 81.6 %
NRBC BLD AUTO-RTO: 0 %
OHS QRS DURATION: 82 MS
OHS QTC CALCULATION: 421 MS
PHOSPHATE SERPL-MCNC: 4.7 MG/DL (ref 2.3–4.7)
PLATELET # BLD AUTO: 175 X10(3)/MCL (ref 130–400)
PMV BLD AUTO: 11 FL (ref 7.4–10.4)
POTASSIUM SERPL-SCNC: 3.7 MMOL/L (ref 3.5–5.1)
PROT SERPL-MCNC: 5.6 GM/DL (ref 5.8–7.6)
RBC # BLD AUTO: 3.36 X10(6)/MCL (ref 4.2–5.4)
SODIUM SERPL-SCNC: 136 MMOL/L (ref 136–145)
VANCOMYCIN SERPL-MCNC: 30.4 UG/ML (ref 15–20)
WBC # SPEC AUTO: 16.3 X10(3)/MCL (ref 4.5–11.5)

## 2024-03-13 PROCEDURE — 90945 DIALYSIS ONE EVALUATION: CPT

## 2024-03-13 PROCEDURE — 84100 ASSAY OF PHOSPHORUS: CPT | Performed by: INTERNAL MEDICINE

## 2024-03-13 PROCEDURE — 63600175 PHARM REV CODE 636 W HCPCS: Performed by: PHYSICIAN ASSISTANT

## 2024-03-13 PROCEDURE — 63600175 PHARM REV CODE 636 W HCPCS: Performed by: INTERNAL MEDICINE

## 2024-03-13 PROCEDURE — 85025 COMPLETE CBC W/AUTO DIFF WBC: CPT | Performed by: INTERNAL MEDICINE

## 2024-03-13 PROCEDURE — 25000003 PHARM REV CODE 250: Performed by: PHYSICIAN ASSISTANT

## 2024-03-13 PROCEDURE — 99232 SBSQ HOSP IP/OBS MODERATE 35: CPT | Mod: ,,, | Performed by: INTERNAL MEDICINE

## 2024-03-13 PROCEDURE — 87040 BLOOD CULTURE FOR BACTERIA: CPT | Performed by: INTERNAL MEDICINE

## 2024-03-13 PROCEDURE — 25000003 PHARM REV CODE 250: Performed by: INTERNAL MEDICINE

## 2024-03-13 PROCEDURE — 80202 ASSAY OF VANCOMYCIN: CPT | Performed by: INTERNAL MEDICINE

## 2024-03-13 PROCEDURE — 80053 COMPREHEN METABOLIC PANEL: CPT | Performed by: INTERNAL MEDICINE

## 2024-03-13 PROCEDURE — 21400001 HC TELEMETRY ROOM

## 2024-03-13 RX ADMIN — PIPERACILLIN SODIUM AND TAZOBACTAM SODIUM 4.5 G: 4; .5 INJECTION, POWDER, LYOPHILIZED, FOR SOLUTION INTRAVENOUS at 02:03

## 2024-03-13 RX ADMIN — ACETAMINOPHEN 650 MG: 325 TABLET, FILM COATED ORAL at 11:03

## 2024-03-13 RX ADMIN — PIPERACILLIN SODIUM AND TAZOBACTAM SODIUM 4.5 G: 4; .5 INJECTION, POWDER, LYOPHILIZED, FOR SOLUTION INTRAVENOUS at 03:03

## 2024-03-13 RX ADMIN — MIDODRINE HYDROCHLORIDE 5 MG: 5 TABLET ORAL at 08:03

## 2024-03-13 RX ADMIN — MIDODRINE HYDROCHLORIDE 5 MG: 5 TABLET ORAL at 09:03

## 2024-03-13 RX ADMIN — ENOXAPARIN SODIUM 30 MG: 30 INJECTION SUBCUTANEOUS at 05:03

## 2024-03-13 RX ADMIN — SODIUM BICARBONATE 650 MG TABLET 650 MG: at 08:03

## 2024-03-13 RX ADMIN — SODIUM BICARBONATE 650 MG TABLET 650 MG: at 02:03

## 2024-03-13 RX ADMIN — SODIUM BICARBONATE 650 MG TABLET 650 MG: at 09:03

## 2024-03-13 RX ADMIN — PREDNISONE 10 MG: 10 TABLET ORAL at 09:03

## 2024-03-13 RX ADMIN — MIDODRINE HYDROCHLORIDE 5 MG: 5 TABLET ORAL at 02:03

## 2024-03-13 NOTE — PROGRESS NOTES
Pharmacokinetic Assessment Follow Up: IV Vancomycin    Vancomycin serum concentration assessment(s):  The random level was drawn correctly and can be used to guide therapy at this time. The measurement is above the desired definitive target range of 15 to 20 mcg/mL.    Vancomycin Regimen Plan:  Continue to pulse-dose  Hold vancomycin for today  Re-dose when the random level is less than 20 mcg/mL, next level to be drawn at 0430 on 03/14 with AM labs      Drug levels (last 3 results):  Recent Labs   Lab Result Units 03/13/24  0442   Vanc Lvl Random ug/ml 30.4*       Vancomycin Administrations:  vancomycin given in the last 96 hours                     vancomycin 2 g in dextrose 5 % 500 mL IVPB (mg) 2,000 mg New Bag 03/12/24 1543                    Pharmacy will continue to follow and monitor vancomycin.    Please contact pharmacy at extension [phone] for questions regarding this assessment.    Thank you for the consult,   June Guzman       Patient brief summary:  Sulma Gonzales is a 58 y.o. female initiated on antimicrobial therapy with IV Vancomycin for treatment of sepsis    The patient's current regimen is pulse-dosed    Drug Allergies:   Review of patient's allergies indicates:   Allergen Reactions    Iodine     Shellfish containing products Rash       Actual Body Weight:  Wt Readings from Last 1 Encounters:   03/12/24 80.7 kg (178 lb)       Renal Function:   Estimated Creatinine Clearance: 12.8 mL/min (A) (based on SCr of 5.09 mg/dL (H)).,     Dialysis Method (if applicable):  N/A (PD as outpatient)    CBC (last 72 hours):  Recent Labs   Lab Result Units 03/12/24  1350 03/13/24  0442   WBC x10(3)/mcL 18.75* 16.30*   Hgb g/dL 11.5* 10.4*   Hct % 33.4* 30.9*   Platelet x10(3)/mcL 173 175   Mono % % 7.1 5.6   Eos % % 0.3 0.1   Basophil % % 0.2 0.2       Metabolic Panel (last 72 hours):  Recent Labs   Lab Result Units 03/12/24  1350 03/12/24  1502 03/13/24  0442   Sodium Level mmol/L 137  --  136   Potassium Level  mmol/L 3.4*  --  3.7   Chloride mmol/L 105  --  105   Carbon Dioxide mmol/L 21*  --  22*   Glucose Level mg/dL 97  --  120*   Glucose, UA   --  Normal  --    Blood Urea Nitrogen mg/dL 38.1*  --  39.7*   Creatinine mg/dL 4.99*  --  5.09*   Albumin Level g/dL 3.1*  --  2.6*   Bilirubin Total mg/dL 0.6  --  0.5   Alkaline Phosphatase unit/L 73  --  65   Aspartate Aminotransferase unit/L 41*  --  62*   Alanine Aminotransferase unit/L 50  --  80*   Phosphorus Level mg/dL  --   --  4.7       Microbiologic Results:  Microbiology Results (last 7 days)       Procedure Component Value Units Date/Time    Body Fluid Culture [[id number]] Collected: 03/12/24 1502    Order Status: Completed Specimen: Body Fluid from Peritoneal Fluid Updated: 03/13/24 0645     Body Fluid Culture No Growth At 24 Hours    Gram Stain [[id number]] Collected: 03/12/24 1502    Order Status: Completed Specimen: Body Fluid from Peritoneal Fluid Updated: 03/12/24 1643     GRAM STAIN Many WBC observed      No bacteria seen    Urine Culture High Risk [[id number]] Collected: 03/12/24 1554    Order Status: Canceled Specimen: Urine, Clean Catch Updated: 03/12/24 1554    Blood Culture [[id number]] Collected: 03/12/24 1545    Order Status: Canceled Specimen: Blood     Blood Culture [[id number]] Collected: 03/12/24 1545    Order Status: Canceled Specimen: Blood     Urine culture [[id number]] Collected: 03/12/24 1502    Order Status: Sent Specimen: Urine, Clean Catch Updated: 03/12/24 1530    Body Fluid Culture [[id number]] Collected: 03/12/24 1502    Order Status: Canceled Specimen: Peritoneal Fluid Updated: 03/12/24 1511    Blood culture #2 **CANNOT BE ORDERED STAT** [[id number]] Collected: 03/12/24 1421    Order Status: Resulted Specimen: Blood Updated: 03/12/24 1424    Blood culture #1 **CANNOT BE ORDERED STAT** [[id number]] Collected: 03/12/24 1421    Order Status: Resulted Specimen: Blood Updated: 03/12/24 1429

## 2024-03-13 NOTE — ED NOTES
"Frank RN called and walked ED nurse through adjusting dialysis machine, after ED nurse found machine alarming w/ "Low fluid output" on message machine.   "

## 2024-03-13 NOTE — NURSING
Nurses Note -- 4 Eyes      3/13/2024   10:42 AM      Skin assessed during: Admit      [x] No Altered Skin Integrity Present    []Prevention Measures Documented      [] Yes- Altered Skin Integrity Present or Discovered   [] LDA Added if Not in Epic (Describe Wound)   [] New Altered Skin Integrity was Present on Admit and Documented in LDA   [] Wound Image Taken    Wound Care Consulted? No    Attending Nurse:  Jose Antonio Cota RN/Staff Member:   SEEMA Alcala

## 2024-03-13 NOTE — PROGRESS NOTES
[facility]      HPI:     Patient Name: Sulma Gonzales  Admission Date: 3/12/2024  Hospital Length of Stay: 1 days  Code Status: No Order   Attending Physician: Lolis Medina MD   Primary Care Physician: Yakelin Person Jr.  Principal Problem:<principal problem not specified>      Today patient seen and examined  Labs, recent events, imaging and medications reviewed for this hospital stay  Feels better  Now admits she did have some dysuria 2 days prior  Currently denies abdominal pain or  symptoms      Review of Systems:   Constitutional: Denies fever, fatigue, generalized weakness, night sweats  Skin: Denies wounds, no rashes, no itching, no new skin lesions  HEENT: Denies acute change in hearing or vision, tinnitus, or dysphagia  Respiratory:  Denies cough, shortness of breath, or wheezing  Cardiovascular: Denies chest pain, palpitations, or swelling  Gastrointestional: Denies abdominal pain, nausea, vomiting, diarrhea, or constipation  Genitourinary: Denies dysuria, hematuria, or incontinence; reports able to empty bladder  Musculoskeletal: Denies myalgias, back pain, flank pain, new decreased ROM or acute focal weakness  Neurological: Denies headaches, seizures, dizziness, paresthesias or asterixis  Hematological: Denies unusual bruising or bleeding  Psychiatric: Denies hallucinations, depression, or confusion      Medications:   Scheduled Meds:   enoxparin  30 mg Subcutaneous Q24H (prophylaxis, 1700)    midodrine  5 mg Oral TID    piperacillin-tazobactam (Zosyn) IV (PEDS and ADULTS) (extended infusion is not appropriate)  4.5 g Intravenous Q12H    predniSONE  10 mg Oral Daily    sodium bicarbonate  650 mg Oral TID     Continuous Infusions:   lactated ringers 50 mL/hr at 03/12/24 1805         Vitals:     Vitals:    03/13/24 0650 03/13/24 0700 03/13/24 0720 03/13/24 0802   BP:  120/75  131/74   Pulse:  81  87   Resp: 18      Temp:   98.7 °F (37.1 °C)    TempSrc:   Oral   "  SpO2:  100%  100%   Weight:       Height:             No intake/output data recorded.  No intake or output data in the 24 hours ending 03/13/24 0817    Physical Exam:   General: no acute distress, awake, alert  Eyes: PERRLA, EOMI, conjunctiva clear, eyelids without swelling  HENT: atraumatic, oropharynx and nasal mucosa patent  Neck: full ROM, no JVD, no thyromegaly or lymphadenopathy  Respiratory: equal, unlabored, clear to auscultation A/P  Cardiovascular: RRR without murmur or rub; BL radial and pedal pulses felt  Edema: none  Gastrointestinal: soft, non-tender, non-distended; positive bowel sounds; no masses to palpation  Genitourinary: no CVA tenderness upon palpation  Musculoskeletal: full ROM without limitation or discomfort  Integumentary: warm, dry; no rashes, wounds, or skin lesions  Neurological: oriented, appropriate, no acute deficits      Labs:     Chemistries:   Recent Labs   Lab 03/12/24  1350 03/13/24  0442    136   K 3.4* 3.7   CO2 21* 22*   BUN 38.1* 39.7*   CREATININE 4.99* 5.09*   CALCIUM 8.3* 8.4   BILITOT 0.6 0.5   ALKPHOS 73 65   ALT 50 80*   AST 41* 62*   GLUCOSE 97 120*   PHOS  --  4.7        CBC/Anemia Labs: Coags:    Recent Labs   Lab 03/12/24  1350 03/13/24  0442   WBC 18.75* 16.30*   HGB 11.5* 10.4*   HCT 33.4* 30.9*    175   MCV 91.5 92.0   RDW 15.2 15.3    No results for input(s): "PT", "INR", "APTT" in the last 168 hours.       Impression:     Complicated urinary tract infection  No suggestion of PD peritonitis or other source of infection  End-stage renal disease on peritoneal dialysis with good PD tolerance    Plan:   DC vancomycin  DC IV fluids  Continue Zosyn for now pending ID consult but usually quinolones  will work better since she has polycystic kidney disease, however we can await sensitivity  Also need to await final blood culture results  In that regard also check CT of the abdomen and pelvis to rule out infected renal cysts           Jennifer Abdalla   "

## 2024-03-13 NOTE — PROGRESS NOTES
[facility] Hospital Medicine Progress Note        Chief Complaint: Inpatient Follow-up for abnormal lab    HPI per admitting team:   The pt is a 58 y.o. AA female with a PMHx  of hypertension, ESRD, S/P renal transplant which failed in November 2023 and recently started on PD, recent CMV/E.coli septicemia admitted to [facility] in 1/2024 and followed by Dr. Gandara since, was asked to come to the ED by Dr. Gandara for further evaluation of fever at home and leukocytosis noted on an outside labs.    Patient reports  fever of 103.5° F yesterday at home associated with an episode of  vomiting and burning with urination on 03/10/2024.  She denies chest pain, cough, shortness of breath, nausea, abd pain, diarrhea, or cloudy PD fluid. Pt's primary Nephrologist is Dr. Abdalla.    On arrival  to the ED, Tmax 103° F, , /68, RR 18 and SpO2 99% on room air.  Labs with WBC 18.75, H&H 11.5/32.4, potassium 3.4, BUN/creatinine 38.1/4.99, troponin 0.023, lactic acid 1.0.  Influenza A/B and SARS-COV-2 PCR negative.  UA with  greater than 100 WBCs, 6-10 RBCs, 500 leukocyte esterase, many white blood cell clumps suggestive of UTI.  EKG sinus tachycardia and nonspecific ST abnormalities.  Chest x-ray no acute cardiopulmonary process.  In ED patient received Tylenol, and Zosyn and subsequently admitted to hospital medicine services for further medical management.     During HM evaluation, pt noted to be hypotensive with BP as low as 89/53. Pt was immediately given bolus  ml, Hydrocortisone 100 mg Iv x 1 due to h/o chronic prednisone treatment. Midodrine initiated and Vancomycin added. Infectious service was consulted. PD fluid obtained for cell count and culture. PD site without redness or discharge. On exam, no abd tenderness appreciated . CT abd/pelvis done on 3/13/24 showed no yany hydronephrosis or perinephric collection of transplanted kidney however limited due to non contrast CT.       Interval  Hx:   Pt reports feeling better subjectively. Still denies abd pain. Had PD overnight.     Fever of 103 at 1100 am today   Blood cultures x2  repeated during temp spike .  BP recovered and stable now . Midodrine continues   PD fluid  with 36% neutrophil , 54% lymphocyte  with culture - NG 24  Blood cultures x2 from 3/12- NG 24h   Urine culture growing  GNR    WBC down to 16K, Hgb 10, Na 136, K 3.7    Case was discussed with patient's nurse and  on the floor.    Objective/physical exam:  General: In no acute distress, afebrile  Chest: Clear to auscultation bilaterally  Heart: RRR, +S1, S2, no appreciable murmur  Abdomen: Soft, nontender, BS +, PD cathter in place. PD site without redness or discharge   MSK: Warm, no lower extremity edema, no clubbing or cyanosis  Neurologic: Alert and oriented x4, Cranial nerve II-XII intact, Strength 5/5 in all 4 extremities    VITAL SIGNS: 24 HRS MIN & MAX LAST   Temp  Min: 98.3 °F (36.8 °C)  Max: 103 °F (39.4 °C) 98.3 °F (36.8 °C)   BP  Min: 81/46  Max: 131/74 112/69   Pulse  Min: 63  Max: 97  74   Resp  Min: 13  Max: 23 18   SpO2  Min: 96 %  Max: 100 % 96 %     I have reviewed the following labs:  Recent Labs   Lab 03/12/24  1350 03/13/24  0442   WBC 18.75* 16.30*   RBC 3.65* 3.36*   HGB 11.5* 10.4*   HCT 33.4* 30.9*   MCV 91.5 92.0   MCH 31.5* 31.0   MCHC 34.4 33.7   RDW 15.2 15.3    175   MPV 10.7* 11.0*     Recent Labs   Lab 03/12/24  1350 03/13/24  0442    136   K 3.4* 3.7   CO2 21* 22*   BUN 38.1* 39.7*   CREATININE 4.99* 5.09*   CALCIUM 8.3* 8.4   ALBUMIN 3.1* 2.6*   ALKPHOS 73 65   ALT 50 80*   AST 41* 62*   BILITOT 0.6 0.5     Microbiology Results (last 7 days)       Procedure Component Value Units Date/Time    Blood culture #2 **CANNOT BE ORDERED STAT** [[id number]]  (Normal) Collected: 03/12/24 1421    Order Status: Completed Specimen: Blood Updated: 03/13/24 1502     CULTURE, BLOOD ([facility]) No Growth At 24 Hours    Blood culture #1 **CANNOT  BE ORDERED STAT** [[id number]]  (Normal) Collected: 03/12/24 1421    Order Status: Completed Specimen: Blood Updated: 03/13/24 1501     CULTURE, BLOOD ([facility]) No Growth At 24 Hours    Blood Culture [[id number]] Collected: 03/13/24 1240    Order Status: Resulted Specimen: Blood Updated: 03/13/24 1244    Blood Culture [[id number]] Collected: 03/13/24 1240    Order Status: Resulted Specimen: Blood Updated: 03/13/24 1244    Urine culture [[id number]]  (Abnormal) Collected: 03/12/24 1502    Order Status: Completed Specimen: Urine, Clean Catch Updated: 03/13/24 0801     Urine Culture >/= 100,000 colonies/ml Gram-negative Rods    Body Fluid Culture [[id number]] Collected: 03/12/24 1502    Order Status: Completed Specimen: Body Fluid from Peritoneal Fluid Updated: 03/13/24 0645     Body Fluid Culture No Growth At 24 Hours    Gram Stain [[id number]] Collected: 03/12/24 1502    Order Status: Completed Specimen: Body Fluid from Peritoneal Fluid Updated: 03/12/24 1643     GRAM STAIN Many WBC observed      No bacteria seen    Urine Culture High Risk [[id number]] Collected: 03/12/24 1554    Order Status: Canceled Specimen: Urine, Clean Catch Updated: 03/12/24 1554    Blood Culture [[id number]] Collected: 03/12/24 1545    Order Status: Canceled Specimen: Blood     Blood Culture [[id number]] Collected: 03/12/24 1545    Order Status: Canceled Specimen: Blood     Body Fluid Culture [[id number]] Collected: 03/12/24 1502    Order Status: Canceled Specimen: Peritoneal Fluid Updated: 03/12/24 1511             See below for Radiology    Scheduled Med:   enoxparin  30 mg Subcutaneous Q24H (prophylaxis, 1700)    midodrine  5 mg Oral TID    piperacillin-tazobactam (Zosyn) IV (PEDS and ADULTS) (extended infusion is not appropriate)  4.5 g Intravenous Q12H    predniSONE  10 mg Oral Daily    sodium bicarbonate  650 mg Oral TID      Continuous Infusions:     PRN Meds:  acetaminophen, hydrALAZINE, ondansetron     Assessment/Plan:  Sepsis due to  Urinary tract infection , POA  UTI due to GNR, POA  Leukocytosis due to infection   Normocytic anemia   Hypokalemia , POA  ESRD, on PD  Transplant recipient -on Imuran, Prednisone    Immunosuppressed state due to medication   Hx- HTN, failed renal transplant     Plan-   BP recovered after initial IVF resuscitation and currently on midodrine support   Monitor hemodynamics and fever curve   Peritoneal fluid cell count not suggestive of peritonitis   Follow up fluid cultures   Follow up blood cultures   Follow up urine culture - final identification and sensitivity   Continue Zosyn and Vancomycin until final cultures are known   ID consult requested to assist   Nephrology is following for PD  Review home meds- hold Imuran. Metoprolol with hypotension , however resume when appropriate   Continue Prednisone , sodium bicarbonate    Critical care note:  Critical care diagnosis: Sepsis requiring IV antibiotic   Critical care interventions: Hands-on evaluation, review of labs/radiographs/records and discussion with patient and family if present  Critical care time spent: 35 minutes      VTE prophylaxis: Lovenox    Patient condition:  Fair     Anticipated discharge and Disposition:     Home with family     All diagnosis and differential diagnosis have been reviewed; assessment and plan has been documented; I have personally reviewed the labs and test results that are presently available; I have reviewed the patients medication list; I have reviewed the consulting providers response and recommendations. I have reviewed or attempted to review medical records based upon their availability    All of the patient's questions have been  addressed and answered. Patient's is agreeable to the above stated plan. I will continue to monitor closely and make adjustments to medical management as needed.  ______________________________________________________________    Lolis Medina MD  Department of Hospital Medicine   [facility]   03/13/2024

## 2024-03-13 NOTE — ED NOTES
Received reports from SEEMA Bowden. Pt resting comfortably in bed afebrile w/ temp 99.4, no complaints for pain, dizziness or weakness. AAOx4, GCS 15.

## 2024-03-13 NOTE — PROGRESS NOTES
03/13/24 0824   Peritoneal Dialysis   Exchange Type Cycler   Peritoneal Treatment Status Completed   Dianeal Solution Dextrose 1.5% in 6000 mL   Cycler Peritoneal Dialysis   Initial Drain Volume (mL) 84 mL   Effluent Appearance Clear   Number of Cycles 3   Fill Volume (mL) 2000 mL   Total Volume (mL) 6000 mL   Dwell Time (specify hr/min) 2:12   Cycler PD Total UF (mL) 222 mL   Cycler Treatment Comments tolerated well, new sterile cap applied, effluent clear yellow, 222ml net UF

## 2024-03-14 LAB
ALBUMIN SERPL-MCNC: 2.5 G/DL (ref 3.4–4.8)
ALBUMIN/GLOB SERPL: 0.8 RATIO (ref 1.1–2)
ALP SERPL-CCNC: 77 UNIT/L (ref 40–150)
ALT SERPL-CCNC: 69 UNIT/L (ref 0–55)
AST SERPL-CCNC: 36 UNIT/L (ref 5–34)
BACTERIA UR CULT: ABNORMAL
BASOPHILS # BLD AUTO: 0.02 X10(3)/MCL
BASOPHILS NFR BLD AUTO: 0.2 %
BILIRUB SERPL-MCNC: 0.3 MG/DL
BUN SERPL-MCNC: 40.3 MG/DL (ref 9.8–20.1)
CALCIUM SERPL-MCNC: 8.6 MG/DL (ref 8.4–10.2)
CHLORIDE SERPL-SCNC: 105 MMOL/L (ref 98–107)
CO2 SERPL-SCNC: 23 MMOL/L (ref 23–31)
CREAT SERPL-MCNC: 5.17 MG/DL (ref 0.55–1.02)
EOSINOPHIL # BLD AUTO: 0.12 X10(3)/MCL (ref 0–0.9)
EOSINOPHIL NFR BLD AUTO: 1 %
ERYTHROCYTE [DISTWIDTH] IN BLOOD BY AUTOMATED COUNT: 14.8 % (ref 11.5–17)
GFR SERPLBLD CREATININE-BSD FMLA CKD-EPI: 9 MLS/MIN/1.73/M2
GLOBULIN SER-MCNC: 3.2 GM/DL (ref 2.4–3.5)
GLUCOSE SERPL-MCNC: 86 MG/DL (ref 82–115)
HCT VFR BLD AUTO: 30.2 % (ref 37–47)
HGB BLD-MCNC: 10.4 G/DL (ref 12–16)
IMM GRANULOCYTES # BLD AUTO: 0.05 X10(3)/MCL (ref 0–0.04)
IMM GRANULOCYTES NFR BLD AUTO: 0.4 %
LYMPHOCYTES # BLD AUTO: 2.33 X10(3)/MCL (ref 0.6–4.6)
LYMPHOCYTES NFR BLD AUTO: 18.9 %
MCH RBC QN AUTO: 31.4 PG (ref 27–31)
MCHC RBC AUTO-ENTMCNC: 34.4 G/DL (ref 33–36)
MCV RBC AUTO: 91.2 FL (ref 80–94)
MONOCYTES # BLD AUTO: 0.87 X10(3)/MCL (ref 0.1–1.3)
MONOCYTES NFR BLD AUTO: 7.1 %
NEUTROPHILS # BLD AUTO: 8.92 X10(3)/MCL (ref 2.1–9.2)
NEUTROPHILS NFR BLD AUTO: 72.4 %
NRBC BLD AUTO-RTO: 0 %
PHOSPHATE SERPL-MCNC: 4.2 MG/DL (ref 2.3–4.7)
PLATELET # BLD AUTO: 171 X10(3)/MCL (ref 130–400)
PMV BLD AUTO: 10.7 FL (ref 7.4–10.4)
POTASSIUM SERPL-SCNC: 3.3 MMOL/L (ref 3.5–5.1)
PROT SERPL-MCNC: 5.7 GM/DL (ref 5.8–7.6)
RBC # BLD AUTO: 3.31 X10(6)/MCL (ref 4.2–5.4)
SODIUM SERPL-SCNC: 140 MMOL/L (ref 136–145)
WBC # SPEC AUTO: 12.31 X10(3)/MCL (ref 4.5–11.5)

## 2024-03-14 PROCEDURE — 25000003 PHARM REV CODE 250: Performed by: INTERNAL MEDICINE

## 2024-03-14 PROCEDURE — 63600175 PHARM REV CODE 636 W HCPCS: Performed by: PHYSICIAN ASSISTANT

## 2024-03-14 PROCEDURE — 90945 DIALYSIS ONE EVALUATION: CPT

## 2024-03-14 PROCEDURE — 87517 HEPATITIS B DNA QUANT: CPT | Performed by: INTERNAL MEDICINE

## 2024-03-14 PROCEDURE — 84100 ASSAY OF PHOSPHORUS: CPT | Performed by: INTERNAL MEDICINE

## 2024-03-14 PROCEDURE — 85025 COMPLETE CBC W/AUTO DIFF WBC: CPT | Performed by: INTERNAL MEDICINE

## 2024-03-14 PROCEDURE — 80053 COMPREHEN METABOLIC PANEL: CPT | Performed by: INTERNAL MEDICINE

## 2024-03-14 PROCEDURE — 21400001 HC TELEMETRY ROOM

## 2024-03-14 PROCEDURE — 99232 SBSQ HOSP IP/OBS MODERATE 35: CPT | Mod: ,,, | Performed by: INTERNAL MEDICINE

## 2024-03-14 PROCEDURE — 63600175 PHARM REV CODE 636 W HCPCS: Performed by: INTERNAL MEDICINE

## 2024-03-14 PROCEDURE — 25000003 PHARM REV CODE 250: Performed by: PHYSICIAN ASSISTANT

## 2024-03-14 RX ORDER — METOPROLOL TARTRATE 25 MG/1
25 TABLET, FILM COATED ORAL ONCE
Status: COMPLETED | OUTPATIENT
Start: 2024-03-14 | End: 2024-03-14

## 2024-03-14 RX ORDER — POTASSIUM CHLORIDE 20 MEQ/1
40 TABLET, EXTENDED RELEASE ORAL ONCE
Status: COMPLETED | OUTPATIENT
Start: 2024-03-14 | End: 2024-03-14

## 2024-03-14 RX ADMIN — MIDODRINE HYDROCHLORIDE 5 MG: 5 TABLET ORAL at 09:03

## 2024-03-14 RX ADMIN — POTASSIUM CHLORIDE 40 MEQ: 1500 TABLET, EXTENDED RELEASE ORAL at 10:03

## 2024-03-14 RX ADMIN — PREDNISONE 10 MG: 10 TABLET ORAL at 09:03

## 2024-03-14 RX ADMIN — SODIUM BICARBONATE 650 MG TABLET 650 MG: at 02:03

## 2024-03-14 RX ADMIN — ENOXAPARIN SODIUM 30 MG: 30 INJECTION SUBCUTANEOUS at 04:03

## 2024-03-14 RX ADMIN — MIDODRINE HYDROCHLORIDE 5 MG: 5 TABLET ORAL at 02:03

## 2024-03-14 RX ADMIN — AZATHIOPRINE 25 MG: 50 TABLET ORAL at 09:03

## 2024-03-14 RX ADMIN — SODIUM BICARBONATE 650 MG TABLET 650 MG: at 09:03

## 2024-03-14 RX ADMIN — METOPROLOL TARTRATE 25 MG: 25 TABLET, FILM COATED ORAL at 06:03

## 2024-03-14 RX ADMIN — SODIUM BICARBONATE 650 MG TABLET 650 MG: at 08:03

## 2024-03-14 RX ADMIN — PIPERACILLIN SODIUM AND TAZOBACTAM SODIUM 4.5 G: 4; .5 INJECTION, POWDER, LYOPHILIZED, FOR SOLUTION INTRAVENOUS at 02:03

## 2024-03-14 NOTE — PROGRESS NOTES
Inpatient Nutrition Evaluation    Admit Date: 3/12/2024   Total duration of encounter: 2 days   Patient Age: 58 y.o.    Nutrition Recommendation/Prescription     Diet Renal On Dialysis as tolerated.     Nutrition Assessment     Chart Review    Reason Seen: continuous nutrition monitoring    Malnutrition Screening Tool Results   Have you recently lost weight without trying?: No  Have you been eating poorly because of a decreased appetite?: No   MST Score: 0   Diagnosis:  Sepsis due to Urinary tract infection , POA  UTI due to GNR, POA  Leukocytosis due to infection   Normocytic anemia   Hypokalemia , POA  ESRD, on PD  Transplant recipient -on Imuran, Prednisone    Immunosuppressed state due to medication     Relevant Medical History:  hypertension, ESRD, S/P renal transplant which failed in November 2023 and recently started on PD, recent CMV/E.coli septicemia      Scheduled Medications:  azaTHIOprine, 25 mg, Daily  enoxparin, 30 mg, Q24H (prophylaxis, 1700)  midodrine, 5 mg, TID  piperacillin-tazobactam (Zosyn) IV (PEDS and ADULTS) (extended infusion is not appropriate), 4.5 g, Q12H  predniSONE, 10 mg, Daily  sodium bicarbonate, 650 mg, TID    Continuous Infusions:   PRN Medications: acetaminophen, hydrALAZINE, ondansetron    Recent Labs   Lab 03/12/24  1350 03/13/24  0442 03/14/24  0456    136 140   K 3.4* 3.7 3.3*   CALCIUM 8.3* 8.4 8.6   PHOS  --  4.7 4.2   CHLORIDE 105 105 105   CO2 21* 22* 23   BUN 38.1* 39.7* 40.3*   CREATININE 4.99* 5.09* 5.17*   EGFRNORACEVR 9 9 9   GLUCOSE 97 120* 86   BILITOT 0.6 0.5 0.3   ALKPHOS 73 65 77   ALT 50 80* 69*   AST 41* 62* 36*   ALBUMIN 3.1* 2.6* 2.5*   WBC 18.75* 16.30* 12.31*   HGB 11.5* 10.4* 10.4*   HCT 33.4* 30.9* 30.2*     Nutrition Orders:  Diet Renal On Dialysis      Appetite/Oral Intake: good/% of meals  Factors Affecting Nutritional Intake: none identified  Food/Hinduism/Cultural Preferences: none reported  Food Allergies: shellfish  Last Bowel  "Movement: 03/13/24  Wound(s):      Comments    3/14/24 Good appetite and intake of meals. Denies any nausea, vomiting or abdominal pain. + loose stools with antibiotics. Thinks dry wt is between 78-80kg.     Anthropometrics    Height: 5' 7" (170.2 cm), Height Method: Stated  Last Weight: 80.7 kg (178 lb) (03/13/24 1002), Weight Method: Standard Scale  BMI (Calculated): 27.9  BMI Classification: overweight (BMI 25-29.9)     Ideal Body Weight (IBW), Female: 135 lb     % Ideal Body Weight, Female (lb): 131.85 %                             Usual Weight Provided By: patient    Wt Readings from Last 5 Encounters:   03/13/24 80.7 kg (178 lb)   01/25/24 80.3 kg (177 lb)   01/25/24 80.6 kg (177 lb 9.6 oz)   12/26/23 81.6 kg (180 lb)   12/22/23 82.4 kg (181 lb 9.6 oz)     Weight Change(s) Since Admission:   Wt Readings from Last 1 Encounters:   03/13/24 1002 80.7 kg (178 lb)   03/12/24 1331 80.7 kg (178 lb)   Admit Weight: 80.7 kg (178 lb) (03/12/24 1331), Weight Method: Stated    Patient Education     Not applicable.    Nutrition Goals & Monitoring     Dietitian will monitor: energy intake and electrolyte/renal panel    Nutrition Risk/Follow-Up: low (follow-up in 5-7 days)  Patients assigned 'low nutrition risk' status do not qualify for a full nutritional assessment but will be monitored and re-evaluated in a 5-7 day time period. Please consult if re-evaluation needed sooner.   "

## 2024-03-14 NOTE — PROGRESS NOTES
[facility] Inpatient Progress Note      HPI:     Patient Name: Sulma Gonzales  Admission Date: 3/12/2024  Hospital Length of Stay: 2 days  Code Status: No Order   Attending Physician: Lolis Medina MD   Primary Care Physician: Yakelin Person Jr.  Principal Problem:<principal problem not specified>      Today patient seen and examined  Labs, recent events, imaging and medications reviewed for this hospital stay  Feels better  No fever today  No SOB    Review of Systems:   Constitutional: Denies fever, fatigue, generalized weakness, night sweats, or acute weight change  Skin: Denies wounds, no rashes, no itching, no new skin lesions  HEENT: Denies acute change in hearing or vision, tinnitus, or dysphagia  Respiratory:  Denies cough, shortness of breath, or wheezing  Cardiovascular: Denies chest pain, palpitations, or swelling  Gastrointestional: Denies abdominal pain, nausea, vomiting, diarrhea, or constipation  Genitourinary: Denies dysuria, hematuria, or incontinence; reports able to empty bladder  Musculoskeletal: Denies myalgias, back pain, flank pain, new decreased ROM or acute focal weakness  Neurological: Denies headaches, seizures, dizziness, paresthesias or asterixis  Hematological: Denies unusual bruising or bleeding  Psychiatric: Denies hallucinations, depression, or confusion      Medications:   Scheduled Meds:   azaTHIOprine  25 mg Oral Daily    enoxparin  30 mg Subcutaneous Q24H (prophylaxis, 1700)    midodrine  5 mg Oral TID    piperacillin-tazobactam (Zosyn) IV (PEDS and ADULTS) (extended infusion is not appropriate)  4.5 g Intravenous Q12H    potassium bicarbonate  50 mEq Oral Once    predniSONE  10 mg Oral Daily    sodium bicarbonate  650 mg Oral TID     Continuous Infusions:      Vitals:     Vitals:    03/13/24 1900 03/14/24 0000 03/14/24 0426 03/14/24 0812   BP: 110/71 134/74 125/69 130/75   Pulse: 76 75 78 88   Resp: 18 18     Temp: 98 °F (36.7 °C) 98.3 °F (36.8 °C) 98.8 °F (37.1 °C)  "98.3 °F (36.8 °C)   TempSrc: Oral  Oral Oral   SpO2: (!) 93% 95% 98% 96%   Weight:       Height:             I/O last 3 completed shifts:  In: 240 [P.O.:240]  Out: 222 [Other:222]    Intake/Output Summary (Last 24 hours) at 3/14/2024 0820  Last data filed at 3/13/2024 1300  Gross per 24 hour   Intake 240 ml   Output 222 ml   Net 18 ml       Physical Exam:   General: no acute distress, awake, alert  Eyes: PERRLA, EOMI, conjunctiva clear, eyelids without swelling  HENT: atraumatic, oropharynx and nasal mucosa patent  Neck: full ROM, no JVD, no thyromegaly or lymphadenopathy  Respiratory: equal, unlabored, clear to auscultation A/P  Cardiovascular: RRR without murmur or rub; BL radial and pedal pulses felt  Edema: none  Gastrointestinal: soft, non-tender, non-distended; positive bowel sounds; no masses to palpation  Genitourinary: no CVA tenderness upon palpation  Musculoskeletal: full ROM without limitation or discomfort  Integumentary: warm, dry; no rashes, wounds, or skin lesions  Neurological: oriented, appropriate, no acute deficits      Labs:     Chemistries:   Recent Labs   Lab 03/12/24  1350 03/13/24  0442 03/14/24  0456    136 140   K 3.4* 3.7 3.3*   CO2 21* 22* 23   BUN 38.1* 39.7* 40.3*   CREATININE 4.99* 5.09* 5.17*   CALCIUM 8.3* 8.4 8.6   BILITOT 0.6 0.5 0.3   ALKPHOS 73 65 77   ALT 50 80* 69*   AST 41* 62* 36*   GLUCOSE 97 120* 86   PHOS  --  4.7 4.2        CBC/Anemia Labs: Coags:    Recent Labs   Lab 03/12/24  1350 03/13/24  0442 03/14/24  0456   WBC 18.75* 16.30* 12.31*   HGB 11.5* 10.4* 10.4*   HCT 33.4* 30.9* 30.2*    175 171   MCV 91.5 92.0 91.2   RDW 15.2 15.3 14.8    No results for input(s): "PT", "INR", "APTT" in the last 168 hours.       Impression:     Complicated UTI  SIRS-->resolving  ?HepBSAg( new)  Recurrent UTIs      Plan:   Prefer to resume low dose imuran to prevent immune response from transplanted kidney  Will check HepBDNA  May need long term suppressive therapy ( bactrim " PO)  DC Planning per ID ( likely pnd final cutlures on oral antibiotics )  Continue PD , since she is retaining fluid will decrease time         Jennifer Abdalla

## 2024-03-14 NOTE — NURSING
03/14/24 0815   Cycler Peritoneal Dialysis   Initial Drain Volume (mL) 44 mL   Effluent Appearance Clear   Number of Cycles 3   Fill Volume (mL) 2000 mL   Total Volume (mL) 6000 mL   Net Positive (mL) 112 mL   Cycler Treatment Comments pt tolerated pd well. Did have to complete 3rd drain after walking in. cath cleaned and new cap applied.

## 2024-03-14 NOTE — PROGRESS NOTES
[facility]  Hospital Medicine Progress Note        Chief Complaint: Inpatient Follow-up    HPI:   58-year-old  female status post kidney transplant in remote past, unfortunately with recent transplant failure secondary to E coli bacteremia and since then has been on peritoneal dialysis, CMV infection in January, 2024 presented to the ED with complaints of abdominal pain at PD site with associated high-grade temperature spike, dysuria.  Patient was febrile and tachycardic in the ED. lab significant for leukocytosis, anemia, renal insufficiency..  UA concerning for UTI chest x-ray negative, patient was initiated on appropriate antimicrobials and was admitted to hospitalist medicine service.  Nephrology and Infectious Disease consulted.  Patient was hypotensive in the ED and received IV fluid bolus/hydrocortisone stress dose.  Patient was initially started on broad-spectrum antibiotics given her immunosuppressed state including Zosyn and vancomycin later deescalated to just Zosyn to cover UTI.  Urine cultures growing Gram-negative rods, final speciation/sensitivity pending.  She was also noted to have positive HBS antigen, recently was negative, patient recently received hep B vaccine.    Interval exam  Patient seen at bedside, son is at bedside, she is now afebrile, hemodynamics are stable, she is concerned about her positive HBsAg, otherwise no specific complaints    Objective/physical exam:  General: In no acute distress, afebrile  Chest: Clear to auscultation bilaterally  Heart: S1, S2, no appreciable murmur  Abdomen: Soft, nontender, BS +  MSK: Warm, no lower extremity edema, no clubbing or cyanosis  Neurologic: Alert and oriented x4, moving all extremities with good strength     VITAL SIGNS: 24 HRS MIN & MAX LAST   Temp  Min: 98 °F (36.7 °C)  Max: 103 °F (39.4 °C) 98.8 °F (37.1 °C)   BP  Min: 110/71  Max: 134/74 125/69   Pulse  Min: 74  Max: 97  78   Resp  Min: 18   Max: 18 18   SpO2  Min: 93 %  Max: 100 % 98 %       Recent Labs   Lab 03/14/24  0456   WBC 12.31*   RBC 3.31*   HGB 10.4*   HCT 30.2*   MCV 91.2   MCH 31.4*   MCHC 34.4   RDW 14.8      MPV 10.7*         Recent Labs   Lab 03/14/24  0456      K 3.3*   CO2 23   BUN 40.3*   CREATININE 5.17*   CALCIUM 8.6   ALBUMIN 2.5*   ALKPHOS 77   ALT 69*   AST 36*   BILITOT 0.3          Microbiology Results (last 7 days)       Procedure Component Value Units Date/Time    Blood culture #2 **CANNOT BE ORDERED STAT** [[id number]]  (Normal) Collected: 03/12/24 1421    Order Status: Completed Specimen: Blood Updated: 03/13/24 1502     CULTURE, BLOOD ([facility]) No Growth At 24 Hours    Blood culture #1 **CANNOT BE ORDERED STAT** [[id number]]  (Normal) Collected: 03/12/24 1421    Order Status: Completed Specimen: Blood Updated: 03/13/24 1501     CULTURE, BLOOD ([facility]) No Growth At 24 Hours    Blood Culture [[id number]] Collected: 03/13/24 1240    Order Status: Resulted Specimen: Blood Updated: 03/13/24 1244    Blood Culture [[id number]] Collected: 03/13/24 1240    Order Status: Resulted Specimen: Blood Updated: 03/13/24 1244    Urine culture [[id number]]  (Abnormal) Collected: 03/12/24 1502    Order Status: Completed Specimen: Urine, Clean Catch Updated: 03/13/24 0801     Urine Culture >/= 100,000 colonies/ml Gram-negative Rods    Body Fluid Culture [[id number]] Collected: 03/12/24 1502    Order Status: Completed Specimen: Body Fluid from Peritoneal Fluid Updated: 03/13/24 0645     Body Fluid Culture No Growth At 24 Hours    Gram Stain [[id number]] Collected: 03/12/24 1502    Order Status: Completed Specimen: Body Fluid from Peritoneal Fluid Updated: 03/12/24 1643     GRAM STAIN Many WBC observed      No bacteria seen    Urine Culture High Risk [[id number]] Collected: 03/12/24 1554    Order Status: Canceled Specimen: Urine, Clean Catch Updated: 03/12/24 1554    Blood Culture [[id number]] Collected: 03/12/24 1545    Order Status:  Canceled Specimen: Blood     Blood Culture [[id number]] Collected: 03/12/24 1545    Order Status: Canceled Specimen: Blood     Body Fluid Culture [[id number]] Collected: 03/12/24 1502    Order Status: Canceled Specimen: Peritoneal Fluid Updated: 03/12/24 1511             Scheduled Med:   enoxparin  30 mg Subcutaneous Q24H (prophylaxis, 1700)    midodrine  5 mg Oral TID    piperacillin-tazobactam (Zosyn) IV (PEDS and ADULTS) (extended infusion is not appropriate)  4.5 g Intravenous Q12H    potassium bicarbonate  50 mEq Oral Once    predniSONE  10 mg Oral Daily    sodium bicarbonate  650 mg Oral TID          Assessment/Plan:    Acute bacterial UTI secondary to GNR  Sepsis with impending septic shock secondary to above-improving  Status post kidney transplant in remote past with recent coli bacteremia resulting in transplant failure, now on peritoneal dialysis  Hypokalemia  Positive HBsAg antigen, likely related to recent hep B vaccine  Prophylaxis    Patient is now afebrile, hemodynamically stable and leukocytosis significantly better compared to admit   Keep IV Zosyn pending final urine culture/sensitivity  Infectious Disease on board and agrees with plan  Continue transplant meds-Imuran, prednisone  DC midodrine since blood pressure is normal   Resume home dose Lopressor  Continue p.o. bicarb   Positive hepatitis-B surface antigen is likely secondary to recent vaccine, previously negative   Infectious Disease ordered hep B DNA quantification  DVT prophylaxis-subQ Lovenox      Wendy Hwang MD   03/14/2024

## 2024-03-14 NOTE — PROGRESS NOTES
Patient's insurance has assigned an RNCM to assist with dc planning if needed: Liya: [phone].

## 2024-03-14 NOTE — PLAN OF CARE
03/14/24 1018   Discharge Assessment   Assessment Type Discharge Planning Assessment   Confirmed/corrected address, phone number and insurance Yes   Confirmed Demographics Correct on Facesheet   Source of Information patient   When was your last doctors appointment? 01/15/24   Communicated REYNA with patient/caregiver Date not available/Unable to determine   Reason For Admission UTI, sepsis   People in Home alone   Do you expect to return to your current living situation? Yes   Do you have help at home or someone to help you manage your care at home? Yes   Who are your caregiver(s) and their phone number(s)? son Srinivas and her sister comes by frequently each week.   Prior to hospitilization cognitive status: Alert/Oriented   Current cognitive status: Alert/Oriented   Walking or Climbing Stairs Difficulty no   Dressing/Bathing Difficulty no   Home Accessibility stairs to enter home   Number of Stairs, Main Entrance three   Stair Railings, Main Entrance none   Home Layout Able to live on 1st floor   Equipment Currently Used at Home other (see comments)  (Peritoneal dialysis set up)   Readmission within 30 days? No   Patient currently being followed by outpatient case management? No   Do you currently have service(s) that help you manage your care at home? No   Do you take prescription medications? Yes   Do you have any problems affording any of your prescribed medications? No   Is the patient taking medications as prescribed? yes   Who is going to help you get home at discharge? son   How do you get to doctors appointments? car, drives self   Are you on dialysis? Yes   Dialysis Name and Scheduled days Peritoneal dialysis nightly   Do you take coumadin? No   Discharge Plan A Home   Discharge Plan B Home   DME Needed Upon Discharge  none   Discharge Plan discussed with: Patient;Adult children   Transition of Care Barriers None   Financial Resource Strain   How hard is it for you to pay for the very basics like  food, housing, medical care, and heating? Not very   Housing Stability   In the last 12 months, was there a time when you were not able to pay the mortgage or rent on time? N   In the last 12 months, was there a time when you did not have a steady place to sleep or slept in a shelter (including now)? N   Transportation Needs   In the past 12 months, has lack of transportation kept you from medical appointments or from getting medications? no   In the past 12 months, has lack of transportation kept you from meetings, work, or from getting things needed for daily living? No   Food Insecurity   Within the past 12 months, you worried that your food would run out before you got the money to buy more. Never true   Within the past 12 months, the food you bought just didn't last and you didn't have money to get more. Never true   Social Connections   In a typical week, how many times do you talk on the phone with family, friends, or neighbors? More than 3   How often do you get together with friends or relatives? More than 3   How often do you attend Hinduism or Congregation services? More than 4   Are you , , , , never , or living with a partner?    Alcohol Use   Q1: How often do you have a drink containing alcohol? Never   Q2: How many drinks containing alcohol do you have on a typical day when you are drinking? None   Q3: How often do you have six or more drinks on one occasion? Never     Pharmacy: [facility]  Pt states her son will be staying with her for a few days after dc and her sister comes by frequently each week to check on her.  Pt denies any dc needs at this time.

## 2024-03-14 NOTE — CONSULTS
Infectious Diseases Consultation       Inpatient consult to Infectious Diseases  Consult performed by: Ben Dawn MD  Consult ordered by: Wendy Hwang MD        HPI:   58-year-old female with past medical history of HTN, ESRD on PD, failed kidney transplant on immunosuppressives, known to my service, seen at [facility] during a couple of prior admissions, 11/23-11/30/2023 treated for E coli sepsis secondary to UTI and 1/8-01/23/2024 for CMV infection and at the time with group B Streptococcus bacteriuria, had completed a course of antiviral considering her immunocompromised status that included ganciclovir with subsequent Valcyte, followed by us at the office.  She is admitted this time to [facility] on 03/12/2024, sent to the ED by our office after she had presented to the office the morning of admission with report of high fever spikes up to 103 though with no much of other complaints at the time.  Her son who is a hospitalist in [place] is in the room currently, just reminded her that she had reported some dysuria. She has been extensively evaluated and noted to have fevers of up to 103.9 with associated leukocytosis of 18.75, anemic with low albumin.  Urinalysis is abnormal with > 100 WBC, 500 LE, trace bacteria a urine culture with more than 100,000 colonies of Gram-negative rods.  Blood cultures have been negative.  Peritoneal fluid analysis not impressive with  and culture negative.  Hepatitis B surface antigen positive.  Chest x-ray with no acute cardiopulmonary process.  CT scan of the abdomen and pelvis without contrast showed atrophy 5 native kidneys compatible with end-stage renal disease, left lower quadrant renal transplant, no yany hydronephrosis or perinephric collection, peritoneal dialysis catheter with small volume free intraperitoneal fluid, hiatal hernia.  She did have issues with hypotension requiring fluid  resuscitation and initiation of midodrine.  She is currently on antibiotic coverage with Zosyn.    Past Medical and Surgical History  Allergies :   Iodine and Shellfish containing products    Medical :   She has a past medical history of Allergy, Anemia, Chronic kidney disease on chronic dialysis, Chronic kidney disease, unspecified, E. coli sepsis (2023), Encounter for blood transfusion (), Hypertension, Menopause, and Vertigo.    Surgical :   She has a past surgical history that includes Kidney transplant;  section; Hysterectomy; Tubal ligation (); and insertion, catheter, dialysis, peritoneal, laparoscopic (N/A, 2024).     Family History  Her family history includes Hypertension in her sister; Kidney failure in her mother, sister, and sister.    Social History  She reports that she has never smoked. She has never been exposed to tobacco smoke. She has never used smokeless tobacco. She reports that she does not drink alcohol and does not use drugs.     Review of Systems   Constitutional:  Positive for fever and malaise/fatigue.   HENT: Negative.     Respiratory: Negative.     Gastrointestinal:  Positive for diarrhea.   Genitourinary: Negative.    Musculoskeletal: Negative.    Endo/Heme/Allergies: Negative.    Psychiatric/Behavioral: Negative.     All other Systems review done and negative.    Objective   Physical Exam  Vitals reviewed.   Constitutional:       General: She is not in acute distress.     Appearance: She is obese. She is not toxic-appearing.      Comments: Lying in bed.  Son at the bedside and daughter-in-law on Speaker phone   HENT:      Head: Normocephalic and atraumatic.   Eyes:      Pupils: Pupils are equal, round, and reactive to light.   Cardiovascular:      Rate and Rhythm: Normal rate and regular rhythm.      Heart sounds: Normal heart sounds.   Pulmonary:      Effort: Pulmonary effort is normal.      Breath sounds: Normal breath sounds.   Abdominal:      General: Bowel  "sounds are normal. There is no distension.      Palpations: Abdomen is soft.      Tenderness: There is no abdominal tenderness.      Comments: PD catheter site intact with no redness and no drainage   Genitourinary:     Comments: No suprapubic tenderness  Musculoskeletal:      Cervical back: Neck supple.      Right lower leg: No edema.      Left lower leg: No edema.   Skin:     Findings: No erythema or rash.   Neurological:      Mental Status: She is alert and oriented to person, place, and time.   Psychiatric:         Thought Content: Thought content normal.       VITAL SIGNS: 24 HR MIN & MAX LAST    Temp  Min: 98 °F (36.7 °C)  Max: 103 °F (39.4 °C)  98.3 °F (36.8 °C)        BP  Min: 110/71  Max: 134/74  130/75     Pulse  Min: 74  Max: 97  88     Resp  Min: 18  Max: 18  18    SpO2  Min: 93 %  Max: 100 %  96 %      HT: 5' 7" (170.2 cm)  WT: 80.7 kg (178 lb)  BMI: 27.9     Recent Results (from the past 24 hour(s))   Comprehensive Metabolic Panel    Collection Time: 03/14/24  4:56 AM   Result Value Ref Range    Sodium Level 140 136 - 145 mmol/L    Potassium Level 3.3 (L) 3.5 - 5.1 mmol/L    Chloride 105 98 - 107 mmol/L    Carbon Dioxide 23 23 - 31 mmol/L    Glucose Level 86 82 - 115 mg/dL    Blood Urea Nitrogen 40.3 (H) 9.8 - 20.1 mg/dL    Creatinine 5.17 (H) 0.55 - 1.02 mg/dL    Calcium Level Total 8.6 8.4 - 10.2 mg/dL    Protein Total 5.7 (L) 5.8 - 7.6 gm/dL    Albumin Level 2.5 (L) 3.4 - 4.8 g/dL    Globulin 3.2 2.4 - 3.5 gm/dL    Albumin/Globulin Ratio 0.8 (L) 1.1 - 2.0 ratio    Bilirubin Total 0.3 <=1.5 mg/dL    Alkaline Phosphatase 77 40 - 150 unit/L    Alanine Aminotransferase 69 (H) 0 - 55 unit/L    Aspartate Aminotransferase 36 (H) 5 - 34 unit/L    eGFR 9 mls/min/1.73/m2   Phosphorus    Collection Time: 03/14/24  4:56 AM   Result Value Ref Range    Phosphorus Level 4.2 2.3 - 4.7 mg/dL   CBC with Differential    Collection Time: 03/14/24  4:56 AM   Result Value Ref Range    WBC 12.31 (H) 4.50 - 11.50 " x10(3)/mcL    RBC 3.31 (L) 4.20 - 5.40 x10(6)/mcL    Hgb 10.4 (L) 12.0 - 16.0 g/dL    Hct 30.2 (L) 37.0 - 47.0 %    MCV 91.2 80.0 - 94.0 fL    MCH 31.4 (H) 27.0 - 31.0 pg    MCHC 34.4 33.0 - 36.0 g/dL    RDW 14.8 11.5 - 17.0 %    Platelet 171 130 - 400 x10(3)/mcL    MPV 10.7 (H) 7.4 - 10.4 fL    Neut % 72.4 %    Lymph % 18.9 %    Mono % 7.1 %    Eos % 1.0 %    Basophil % 0.2 %    Lymph # 2.33 0.6 - 4.6 x10(3)/mcL    Neut # 8.92 2.1 - 9.2 x10(3)/mcL    Mono # 0.87 0.1 - 1.3 x10(3)/mcL    Eos # 0.12 0 - 0.9 x10(3)/mcL    Baso # 0.02 <=0.2 x10(3)/mcL    IG# 0.05 (H) 0 - 0.04 x10(3)/mcL    IG% 0.4 %    NRBC% 0.0 %       Imaging  Imaging Results              CT Abdomen Pelvis  Without Contrast (Final result)  Result time 03/13/24 11:04:12      Final result by Donya Kim MD (03/13/24 11:04:12)                   Impression:      1. Atrophied native kidneys compatible with end-stage renal disease.  There is a left lower quadrant renal transplant.  No yany hydronephrosis or perinephric collection.  Further characterization limited on noncontrast CT.  If there is acute concern for transplant failure suggest sonogram  2. Peritoneal dialysis catheter with small volume free intraperitoneal fluid  3. Hiatal hernia      Electronically signed by: Donya Kim  Date:    03/13/2024  Time:    11:04               Narrative:    EXAMINATION:  CT ABDOMEN PELVIS WITHOUT CONTRAST    CLINICAL HISTORY:  Sepsis;UTI, recurrent/complicated (Female);    TECHNIQUE:  Helically acquired axial images, sagittal and coronal reformations were obtained from the lung bases to the pubic symphysis without the IV administration of contrast.    Automated tube current modulation, weight-based exposure dosing, and/or iterative reconstruction technique utilized to reach lowest reasonably achievable exposure rate.    DLP: 293 mGy*cm    COMPARISON:  No relevant prior available for comparison at the time of dictation.    FINDINGS:  HEART: There are  coronary artery calcifications.    LUNG BASES: Well aerated.    LIVER: Normal attenuation. No appreciable focal hepatic lesion.    BILIARY: No calcified gallstones.    PANCREAS: No inflammatory change.    SPLEEN: Normal in size    ADRENALS: No mass.    KIDNEYS/URETERS: Atrophied native kidneys.  No hydronephrosis.  Left lower quadrant renal transplant.  No yany hydronephrosis.    GI TRACT/MESENTERY: Evaluation of the bowel is limited without contrast.  Hiatal small no evidence of bowel obstruction or inflammation. The appendix is normal.    PERITONEUM: Small volume free intraperitoneal fluid.  Peritoneal dialysis catheter is coiled at the lower abdomen/pelvis near midline.No free air.    LYMPH NODES: Nonspecific left iliac chain lymph nodes measure up to 1.1 cm short axis.    VASCULATURE: No significant atherosclerosis or aneurysm.    BLADDER: Nondistended bladder limits CT evaluation    REPRODUCTIVE ORGANS: Uterus is surgically absent.    ABDOMINAL WALL: Unremarkable.    BONES: No acute osseous abnormality.                                       X-Ray Chest PA And Lateral (Final result)  Result time 03/12/24 14:41:06      Final result by Micah Ritter MD (03/12/24 14:41:06)                   Impression:      No acute cardiopulmonary process.      Electronically signed by: Micah Ritter  Date:    03/12/2024  Time:    14:41               Narrative:    EXAMINATION:  XR CHEST PA AND LATERAL    CLINICAL HISTORY:  Fever, unspecified    TECHNIQUE:  Two views of the chest    COMPARISON:  No prior imaging available for comparison.    FINDINGS:  No focal opacification, pleural effusion, or pneumothorax.    The cardiomediastinal silhouette is within normal limits.    No acute osseous abnormality.                        Wet Read by Duong Gaytan MD (03/12/24 14:40:51, [facility] - Emergency Dept, Emergency Medicine)    Neg                                       Impression  1. Sepsis with fevers,  leukocytosis and hypotension  2. Gram-negative complicated UTI   3. Immunocompromised s/p kidney transplant on immunosuppressive drugs  4.  End-stage renal disease on peritoneal dialysis   5.  Hepatitis-B infection  6.  Diarrhea  7.  Anemia    Recommendations  I agree with the management of this patient.  Immunocompromised patient with history of end-stage renal disease status post failed kidney transplant on immunosuppressive drugs presenting with high fever spikes and leukocytosis with workup revealing abnormal urinalysis and Gram-negative rods isolated from the urine suggesting a primary source of infection.  She was noted to have very high fever spikes just like her previous admissions, noted to have fevers of up to 104 during both admissions including that of November with similar presentation of sepsis secondary to UTI.  She does seem to have responded to treatment temperature curve and leukocytosis trending down, hence we will continue current antibiotic coverage with Zosyn and follow final urine cultures for identification of the Gram-negative with susceptibilities as well as follow final blood cultures.  We will follow with labs in a.m. and continue to trend temperature curve.  She is noted to have positive hepatitis-B surface antigen which was recently negative and will be followed by quantitative hepatitis-B DNA level.  We will continue peritoneal dialysis per Nephrology.  Case is discussed at length with patient, her son who is an MD at bedside, and daughter in-law, also an MD on speaker phone.  I have also discussed the case with nursing staff.  I would like to thank the team very much for the opportunity to assist in the care of this patient.

## 2024-03-15 VITALS
BODY MASS INDEX: 27.94 KG/M2 | WEIGHT: 178 LBS | SYSTOLIC BLOOD PRESSURE: 143 MMHG | TEMPERATURE: 99 F | HEIGHT: 67 IN | DIASTOLIC BLOOD PRESSURE: 88 MMHG | HEART RATE: 70 BPM | RESPIRATION RATE: 18 BRPM | OXYGEN SATURATION: 97 %

## 2024-03-15 PROBLEM — N18.6 ESRD (END STAGE RENAL DISEASE): Status: ACTIVE | Noted: 2024-03-15

## 2024-03-15 LAB
ALBUMIN SERPL-MCNC: 2.4 G/DL (ref 3.4–4.8)
ALBUMIN/GLOB SERPL: 0.8 RATIO (ref 1.1–2)
ALP SERPL-CCNC: 79 UNIT/L (ref 40–150)
ALT SERPL-CCNC: 53 UNIT/L (ref 0–55)
AST SERPL-CCNC: 25 UNIT/L (ref 5–34)
BASOPHILS # BLD AUTO: 0.02 X10(3)/MCL
BASOPHILS NFR BLD AUTO: 0.2 %
BILIRUB SERPL-MCNC: 0.2 MG/DL
BUN SERPL-MCNC: 31.2 MG/DL (ref 9.8–20.1)
CALCIUM SERPL-MCNC: 8.3 MG/DL (ref 8.4–10.2)
CHLORIDE SERPL-SCNC: 110 MMOL/L (ref 98–107)
CO2 SERPL-SCNC: 23 MMOL/L (ref 23–31)
CREAT SERPL-MCNC: 4.63 MG/DL (ref 0.55–1.02)
EOSINOPHIL # BLD AUTO: 0.1 X10(3)/MCL (ref 0–0.9)
EOSINOPHIL NFR BLD AUTO: 1.2 %
ERYTHROCYTE [DISTWIDTH] IN BLOOD BY AUTOMATED COUNT: 14.6 % (ref 11.5–17)
GFR SERPLBLD CREATININE-BSD FMLA CKD-EPI: 10 MLS/MIN/1.73/M2
GLOBULIN SER-MCNC: 3.1 GM/DL (ref 2.4–3.5)
GLUCOSE SERPL-MCNC: 78 MG/DL (ref 82–115)
HCT VFR BLD AUTO: 27.3 % (ref 37–47)
HGB BLD-MCNC: 9.5 G/DL (ref 12–16)
IMM GRANULOCYTES # BLD AUTO: 0.03 X10(3)/MCL (ref 0–0.04)
IMM GRANULOCYTES NFR BLD AUTO: 0.4 %
LYMPHOCYTES # BLD AUTO: 1.97 X10(3)/MCL (ref 0.6–4.6)
LYMPHOCYTES NFR BLD AUTO: 23.1 %
MCH RBC QN AUTO: 31 PG (ref 27–31)
MCHC RBC AUTO-ENTMCNC: 34.8 G/DL (ref 33–36)
MCV RBC AUTO: 89.2 FL (ref 80–94)
MONOCYTES # BLD AUTO: 0.69 X10(3)/MCL (ref 0.1–1.3)
MONOCYTES NFR BLD AUTO: 8.1 %
NEUTROPHILS # BLD AUTO: 5.7 X10(3)/MCL (ref 2.1–9.2)
NEUTROPHILS NFR BLD AUTO: 67 %
NRBC BLD AUTO-RTO: 0 %
PHOSPHATE SERPL-MCNC: 3.2 MG/DL (ref 2.3–4.7)
PLATELET # BLD AUTO: 187 X10(3)/MCL (ref 130–400)
PMV BLD AUTO: 11.2 FL (ref 7.4–10.4)
POTASSIUM SERPL-SCNC: 2.8 MMOL/L (ref 3.5–5.1)
POTASSIUM SERPL-SCNC: 3.5 MMOL/L (ref 3.5–5.1)
PROT SERPL-MCNC: 5.5 GM/DL (ref 5.8–7.6)
PTH-INTACT SERPL-MCNC: 222.6 PG/ML (ref 8.7–77)
RBC # BLD AUTO: 3.06 X10(6)/MCL (ref 4.2–5.4)
SODIUM SERPL-SCNC: 144 MMOL/L (ref 136–145)
WBC # SPEC AUTO: 8.51 X10(3)/MCL (ref 4.5–11.5)

## 2024-03-15 PROCEDURE — 84100 ASSAY OF PHOSPHORUS: CPT | Performed by: INTERNAL MEDICINE

## 2024-03-15 PROCEDURE — 25000003 PHARM REV CODE 250: Performed by: INTERNAL MEDICINE

## 2024-03-15 PROCEDURE — 84132 ASSAY OF SERUM POTASSIUM: CPT | Performed by: INTERNAL MEDICINE

## 2024-03-15 PROCEDURE — 25000003 PHARM REV CODE 250: Performed by: PHYSICIAN ASSISTANT

## 2024-03-15 PROCEDURE — 99232 SBSQ HOSP IP/OBS MODERATE 35: CPT | Mod: ,,, | Performed by: INTERNAL MEDICINE

## 2024-03-15 PROCEDURE — 63600175 PHARM REV CODE 636 W HCPCS: Performed by: INTERNAL MEDICINE

## 2024-03-15 PROCEDURE — 83970 ASSAY OF PARATHORMONE: CPT | Performed by: INTERNAL MEDICINE

## 2024-03-15 PROCEDURE — 85025 COMPLETE CBC W/AUTO DIFF WBC: CPT | Performed by: INTERNAL MEDICINE

## 2024-03-15 PROCEDURE — 63600175 PHARM REV CODE 636 W HCPCS: Performed by: PHYSICIAN ASSISTANT

## 2024-03-15 PROCEDURE — 80053 COMPREHEN METABOLIC PANEL: CPT | Performed by: INTERNAL MEDICINE

## 2024-03-15 RX ORDER — POTASSIUM CHLORIDE 20 MEQ/1
20 TABLET, EXTENDED RELEASE ORAL DAILY
Status: DISCONTINUED | OUTPATIENT
Start: 2024-03-15 | End: 2024-03-15

## 2024-03-15 RX ORDER — POTASSIUM CHLORIDE 20 MEQ/1
40 TABLET, EXTENDED RELEASE ORAL EVERY 4 HOURS
Status: DISCONTINUED | OUTPATIENT
Start: 2024-03-15 | End: 2024-03-15 | Stop reason: HOSPADM

## 2024-03-15 RX ORDER — SELENIUM 50 MCG
1 TABLET ORAL DAILY
Status: DISCONTINUED | OUTPATIENT
Start: 2024-03-15 | End: 2024-03-15 | Stop reason: HOSPADM

## 2024-03-15 RX ORDER — POTASSIUM CHLORIDE 20 MEQ/1
40 TABLET, EXTENDED RELEASE ORAL DAILY
Qty: 28 TABLET | Refills: 0 | Status: SHIPPED | OUTPATIENT
Start: 2024-03-16 | End: 2024-03-30

## 2024-03-15 RX ORDER — CIPROFLOXACIN 500 MG/1
500 TABLET ORAL DAILY
Qty: 6 TABLET | Refills: 0 | Status: SHIPPED | OUTPATIENT
Start: 2024-03-15 | End: 2024-03-21

## 2024-03-15 RX ORDER — CIPROFLOXACIN 500 MG/1
500 TABLET ORAL EVERY 24 HOURS
Status: DISCONTINUED | OUTPATIENT
Start: 2024-03-15 | End: 2024-03-15 | Stop reason: HOSPADM

## 2024-03-15 RX ORDER — POTASSIUM CHLORIDE 20 MEQ/1
40 TABLET, EXTENDED RELEASE ORAL EVERY 4 HOURS
Status: DISCONTINUED | OUTPATIENT
Start: 2024-03-15 | End: 2024-03-15 | Stop reason: SDUPTHER

## 2024-03-15 RX ORDER — POTASSIUM CHLORIDE 20 MEQ/1
40 TABLET, EXTENDED RELEASE ORAL DAILY
Status: DISCONTINUED | OUTPATIENT
Start: 2024-03-15 | End: 2024-03-15 | Stop reason: HOSPADM

## 2024-03-15 RX ORDER — CIPROFLOXACIN 250 MG/1
250 TABLET, FILM COATED ORAL EVERY 24 HOURS
Status: DISCONTINUED | OUTPATIENT
Start: 2024-03-15 | End: 2024-03-15

## 2024-03-15 RX ADMIN — POTASSIUM CHLORIDE 40 MEQ: 1500 TABLET, EXTENDED RELEASE ORAL at 10:03

## 2024-03-15 RX ADMIN — Medication 1 CAPSULE: at 01:03

## 2024-03-15 RX ADMIN — PREDNISONE 10 MG: 10 TABLET ORAL at 10:03

## 2024-03-15 RX ADMIN — CIPROFLOXACIN HYDROCHLORIDE 500 MG: 500 TABLET, FILM COATED ORAL at 10:03

## 2024-03-15 RX ADMIN — SODIUM BICARBONATE 650 MG TABLET 650 MG: at 10:03

## 2024-03-15 RX ADMIN — POTASSIUM CHLORIDE 40 MEQ: 1500 TABLET, EXTENDED RELEASE ORAL at 12:03

## 2024-03-15 RX ADMIN — PIPERACILLIN SODIUM AND TAZOBACTAM SODIUM 4.5 G: 4; .5 INJECTION, POWDER, LYOPHILIZED, FOR SOLUTION INTRAVENOUS at 02:03

## 2024-03-15 RX ADMIN — AZATHIOPRINE 25 MG: 50 TABLET ORAL at 10:03

## 2024-03-15 NOTE — PROGRESS NOTES
[facility] Inpatient Progress Note      HPI:     Patient Name: Sulma Gonzales  Admission Date: 3/12/2024  Hospital Length of Stay: 3 days  Code Status: No Order   Attending Physician: Lolis Medina MD   Primary Care Physician: Yakelin Person Jr.  Principal Problem:ESRD (end stage renal disease)      Today patient seen and examined  Labs, recent events, imaging and medications reviewed for this hospital stay  Feels better  No SOB  No NB    Review of Systems:   No abd pain  Good PD tolerance  No fever        Medications:   Scheduled Meds:   azaTHIOprine  25 mg Oral Daily    cefTRIAXone (Rocephin) IV (PEDS and ADULTS)  1 g Intravenous Q24H    ciprofloxacin HCl  500 mg Oral Daily    enoxparin  30 mg Subcutaneous Q24H (prophylaxis, 1700)    potassium chloride  40 mEq Oral Q4H    potassium chloride  40 mEq Oral Daily    predniSONE  10 mg Oral Daily    sodium bicarbonate  650 mg Oral TID     Continuous Infusions:      Vitals:     Vitals:    03/14/24 1935 03/15/24 0012 03/15/24 0311 03/15/24 0729   BP: (!) 153/81 (!) 143/84 (!) 152/80 (!) 143/88   Pulse: 66 76 63 70   Resp: 18   18   Temp: 98.3 °F (36.8 °C) 98 °F (36.7 °C) 98.4 °F (36.9 °C) 98.6 °F (37 °C)   TempSrc: Oral Oral Oral Oral   SpO2: 97% 95% 96% 97%   Weight:       Height:             I/O last 3 completed shifts:  In: 600 [P.O.:600]  Out: -     Intake/Output Summary (Last 24 hours) at 3/15/2024 0858  Last data filed at 3/14/2024 2233  Gross per 24 hour   Intake 600 ml   Output --   Net 600 ml       Physical Exam:   General: no acute distress, awake, alert  Eyes: PERRLA, EOMI, conjunctiva clear, eyelids without swelling  HENT: atraumatic, oropharynx and nasal mucosa patent  Neck: full ROM, no JVD, no thyromegaly or lymphadenopathy  Respiratory: equal, unlabored, clear to auscultation A/P  Cardiovascular: RRR without murmur or rub; BL radial and pedal pulses felt  Edema: none  Gastrointestinal: soft, non-tender, non-distended; positive bowel sounds;  "no masses to palpation  Genitourinary: no CVA tenderness upon palpation  Musculoskeletal: full ROM without limitation or discomfort  Integumentary: warm, dry; no rashes, wounds, or skin lesions  Neurological: oriented, appropriate, no acute deficits        Labs:     Chemistries:   Recent Labs   Lab 03/13/24  0442 03/14/24  0456 03/15/24  0427    140 144   K 3.7 3.3* 2.8*   CO2 22* 23 23   BUN 39.7* 40.3* 31.2*   CREATININE 5.09* 5.17* 4.63*   CALCIUM 8.4 8.6 8.3*   BILITOT 0.5 0.3 0.2   ALKPHOS 65 77 79   ALT 80* 69* 53   AST 62* 36* 25   GLUCOSE 120* 86 78*   PHOS 4.7 4.2 3.2        CBC/Anemia Labs: Coags:    Recent Labs   Lab 03/13/24  0442 03/14/24  0456 03/15/24  0427   WBC 16.30* 12.31* 8.51   HGB 10.4* 10.4* 9.5*   HCT 30.9* 30.2* 27.3*    171 187   MCV 92.0 91.2 89.2   RDW 15.3 14.8 14.6    No results for input(s): "PT", "INR", "APTT" in the last 168 hours.       Impression:     Patient Active Problem List   Diagnosis    ESRD (end stage renal disease)   Complicated UTI, kleibsiella  Rejected renal transplant  hypokalemia      Plan:   Agree with K replacement  OK TO DC HOME    DC ON :  KCL 40meq daily  CIPRO 500mg po daily for 7 days  I arranged CMP as outpt in dialysis next week             Jennifer Abdalla   "

## 2024-03-15 NOTE — DISCHARGE SUMMARY
[facility] Hospital Medicine Discharge Summary    Admit Date: 3/12/2024  Discharge Date and Time: 3/15/982815:56 AM  Admitting Physician: DEVEN Team  Discharging Physician: Wendy Hwang MD.  Primary Care Physician: Yakelin Person Jr.    Discharge Diagnoses:  Acute bacterial UTI secondary to Klebsiella  Sepsis with impending septic shock secondary to above-improving  Status post kidney transplant in remote past with recent coli bacteremia resulting in transplant failure, now on peritoneal dialysis  Hypokalemia  Positive HBsAg antigen, likely related to recent hep B vaccine    Hospital Course:   58-year-old  female status post kidney transplant in remote past, unfortunately with recent transplant failure secondary to E coli bacteremia and since then has been on peritoneal dialysis, CMV infection in January, 2024 presented to the ED with complaints of abdominal pain at PD site with associated high-grade temperature spike, dysuria.  Patient was febrile and tachycardic in the ED. lab significant for leukocytosis, anemia, renal insufficiency..  UA concerning for UTI chest x-ray negative, patient was initiated on appropriate antimicrobials and was admitted to hospitalist medicine service.  Nephrology and Infectious Disease consulted.  Patient was hypotensive in the ED and received IV fluid bolus/hydrocortisone stress dose.  Patient was initially started on broad-spectrum antibiotics given her immunosuppressed state including Zosyn and vancomycin later deescalated to just Zosyn to cover UTI.  Urine cultures growing Gram-negative rods, final speciation/sensitivity pending.  She was also noted to have positive HBS antigen, recently was negative, patient recently received hep B vaccine.  Patient remaining afebrile, hemodynamically stable and leukocytosis significantly better on 03/14 compared to admit, IV Zosyn continued pending final culture and sensitivity, transplant  medications continued, blood pressure normal and therefore midodrine discontinued, home Lopressor was resumed, p.o. bicarb continued, Infectious Disease ordered hep B DNA quantification to further evaluate positive HBsAg which could be related to recent vaccine, patient re-evaluated 3/15, potassium low, she has chronic hypokalemia and is on potassium supplementation at home.  Ordered appropriate replacement, potassium improved, nephrology recommended to discharge her on p.o. potassium supplementation-40 mEq daily, urine cultures finalized, came back positive for pansensitive Klebsiella, patient has had allergic reaction/transaminitis secondary to cephalosporin use in the past and therefore decided to discharge on renally dosed ciprofloxacin for 7 more days, discharged in stable condition on 03/15 and recommended follow-up with PCP, Nephrology, continue HD as outpatient, treatment plans discussed with patient, family and they voiced understanding to everything explained  Vitals:  VITAL SIGNS: 24 HRS MIN & MAX LAST   Temp  Min: 98 °F (36.7 °C)  Max: 98.7 °F (37.1 °C) 98.6 °F (37 °C)   BP  Min: 139/78  Max: 153/81 (!) 143/88   Pulse  Min: 63  Max: 76  70   Resp  Min: 18  Max: 18 18   SpO2  Min: 95 %  Max: 98 % 97 %       Physical Exam:  General appearance:  No acute distress  HENT: Atraumatic   Lungs: Clear to auscultation bilaterally.   Heart: RRR,No edema  Abdomen: Soft, non tender   Extremities: warm  Neuro:  Awake, alert, oriented x4  Psych/mental status: Appropriate mood and affect.    Procedures Performed: No admission procedures for hospital encounter.     Significant Diagnostic Studies: See Full reports for all details    Recent Labs   Lab 03/13/24  0442 03/14/24  0456 03/15/24  0427   WBC 16.30* 12.31* 8.51   RBC 3.36* 3.31* 3.06*   HGB 10.4* 10.4* 9.5*   HCT 30.9* 30.2* 27.3*   MCV 92.0 91.2 89.2   MCH 31.0 31.4* 31.0   MCHC 33.7 34.4 34.8   RDW 15.3 14.8 14.6    171 187   MPV 11.0* 10.7* 11.2*        Recent Labs   Lab 03/13/24  0442 03/14/24  0456 03/15/24  0427    140 144   K 3.7 3.3* 2.8*   CO2 22* 23 23   BUN 39.7* 40.3* 31.2*   CREATININE 5.09* 5.17* 4.63*   CALCIUM 8.4 8.6 8.3*   ALBUMIN 2.6* 2.5* 2.4*   ALKPHOS 65 77 79   ALT 80* 69* 53   AST 62* 36* 25   BILITOT 0.5 0.3 0.2        Microbiology Results (last 7 days)       Procedure Component Value Units Date/Time    Body Fluid Culture [[id number]] Collected: 03/12/24 1502    Order Status: Completed Specimen: Body Fluid from Peritoneal Fluid Updated: 03/15/24 0826     Body Fluid Culture No Growth At 72 Hours    Blood culture #2 **CANNOT BE ORDERED STAT** [[id number]]  (Normal) Collected: 03/12/24 1421    Order Status: Completed Specimen: Blood Updated: 03/14/24 1502     CULTURE, BLOOD ([facility]) No Growth At 48 Hours    Blood culture #1 **CANNOT BE ORDERED STAT** [[id number]]  (Normal) Collected: 03/12/24 1421    Order Status: Completed Specimen: Blood Updated: 03/14/24 1502     CULTURE, BLOOD ([facility]) No Growth At 48 Hours    Blood Culture [[id number]]  (Normal) Collected: 03/13/24 1240    Order Status: Completed Specimen: Blood Updated: 03/14/24 1301     CULTURE, BLOOD ([facility]) No Growth At 24 Hours    Blood Culture [[id number]]  (Normal) Collected: 03/13/24 1240    Order Status: Completed Specimen: Blood Updated: 03/14/24 1301     CULTURE, BLOOD ([facility]) No Growth At 24 Hours    Urine culture [[id number]]  (Abnormal)  (Susceptibility) Collected: 03/12/24 1502    Order Status: Completed Specimen: Urine, Clean Catch Updated: 03/14/24 0913     Urine Culture >/= 100,000 colonies/ml Klebsiella pneumoniae ssp ozaenae    Gram Stain [[id number]] Collected: 03/12/24 1502    Order Status: Completed Specimen: Body Fluid from Peritoneal Fluid Updated: 03/12/24 1643     GRAM STAIN Many WBC observed      No bacteria seen    Urine Culture High Risk [[id number]] Collected: 03/12/24 1554    Order Status: Canceled Specimen: Urine, Clean Catch Updated: 03/12/24 1554    Blood  Culture [[id number]] Collected: 03/12/24 1545    Order Status: Canceled Specimen: Blood     Blood Culture [[id number]] Collected: 03/12/24 1545    Order Status: Canceled Specimen: Blood     Body Fluid Culture [[id number]] Collected: 03/12/24 1502    Order Status: Canceled Specimen: Peritoneal Fluid Updated: 03/12/24 1511             CT Abdomen Pelvis  Without Contrast  Narrative: EXAMINATION:  CT ABDOMEN PELVIS WITHOUT CONTRAST    CLINICAL HISTORY:  Sepsis;UTI, recurrent/complicated (Female);    TECHNIQUE:  Helically acquired axial images, sagittal and coronal reformations were obtained from the lung bases to the pubic symphysis without the IV administration of contrast.    Automated tube current modulation, weight-based exposure dosing, and/or iterative reconstruction technique utilized to reach lowest reasonably achievable exposure rate.    DLP: 293 mGy*cm    COMPARISON:  No relevant prior available for comparison at the time of dictation.    FINDINGS:  HEART: There are coronary artery calcifications.    LUNG BASES: Well aerated.    LIVER: Normal attenuation. No appreciable focal hepatic lesion.    BILIARY: No calcified gallstones.    PANCREAS: No inflammatory change.    SPLEEN: Normal in size    ADRENALS: No mass.    KIDNEYS/URETERS: Atrophied native kidneys.  No hydronephrosis.  Left lower quadrant renal transplant.  No yany hydronephrosis.    GI TRACT/MESENTERY: Evaluation of the bowel is limited without contrast.  Hiatal small no evidence of bowel obstruction or inflammation. The appendix is normal.    PERITONEUM: Small volume free intraperitoneal fluid.  Peritoneal dialysis catheter is coiled at the lower abdomen/pelvis near midline.No free air.    LYMPH NODES: Nonspecific left iliac chain lymph nodes measure up to 1.1 cm short axis.    VASCULATURE: No significant atherosclerosis or aneurysm.    BLADDER: Nondistended bladder limits CT evaluation    REPRODUCTIVE ORGANS: Uterus is surgically  absent.    ABDOMINAL WALL: Unremarkable.    BONES: No acute osseous abnormality.  Impression: 1. Atrophied native kidneys compatible with end-stage renal disease.  There is a left lower quadrant renal transplant.  No yany hydronephrosis or perinephric collection.  Further characterization limited on noncontrast CT.  If there is acute concern for transplant failure suggest sonogram  2. Peritoneal dialysis catheter with small volume free intraperitoneal fluid  3. Hiatal hernia    Electronically signed by: Donya Kim  Date:    03/13/2024  Time:    11:04         Medication List        START taking these medications      ciprofloxacin HCl 500 MG tablet  Commonly known as: CIPRO  Take 1 tablet (500 mg total) by mouth once daily. for 6 doses            CHANGE how you take these medications      potassium chloride SA 20 MEQ tablet  Commonly known as: K-DURMARCIAOR-CON  Take 2 tablets (40 mEq total) by mouth once daily. for 14 days  Start taking on: March 16, 2024  What changed: how much to take            CONTINUE taking these medications      azaTHIOprine 50 mg Tab  Commonly known as: IMURAN     b complex vitamins capsule     cholecalciferol (vitamin D3) 50 mcg (2,000 unit) Cap capsule  Commonly known as: VITAMIN D3     FERROUS SULFATE ORAL     metoprolol tartrate 25 MG tablet  Commonly known as: LOPRESSOR     predniSONE 10 MG tablet  Commonly known as: DELTASONE     sodium bicarbonate 650 MG tablet     zinc gluconate 50 mg tablet               Where to Get Your Medications        These medications were sent to [facility] - [address] [address]      Phone: [phone]   ciprofloxacin HCl 500 MG tablet  potassium chloride SA 20 MEQ tablet          Explained in detail to the patient about the discharge plan, medications, and follow-up visits. Pt understands and agrees with the treatment plan  Discharge Disposition: Home or Self Care    Discharged Condition: stable  Diet-   Dietary Orders (From admission, onward)       Start     Ordered    03/12/24 1442  Diet Renal On Dialysis  Diet effective now         03/12/24 1443                   Medications Per DC med rec  Activities as tolerated   Follow-up Information       Yakelin Person Jr. Follow up in 1 week(s).    Specialty: Internal Medicine  Contact information:  [address] [facility] [address]  [phone]               Ben Dawn MD Follow up in 1 week(s).    Specialty: Infectious Diseases  Contact information:  [address] [facility] [id number] [phone]               Jennifer Abdalla MD Follow up in 1 week(s).    Specialty: Nephrology  Contact information:  [address] [facility] [address]  [phone]                           For further questions contact hospitalist office    Discharge time 33 minutes    For worsening symptoms, chest pain, shortness of breath, increased abdominal pain, high grade fever, stroke or stroke like symptoms, immediately go to the nearest Emergency Room or call 911 as soon as possible.      Wendy Olivarez M.D on 3/15/2024. at 10:56 AM.      -

## 2024-03-15 NOTE — PROGRESS NOTES
Infectious Diseases Progress Note  58-year-old female with past medical history of HTN, ESRD on PD, failed kidney transplant on immunosuppressives, known to my service, seen at [facility] during a couple of prior admissions, 11/23-11/30/2023 treated for E coli sepsis secondary to UTI and 1/8-01/23/2024 for CMV infection and at the time with group B Streptococcus bacteriuria, had completed a course of antiviral considering her immunocompromised status that included ganciclovir with subsequent Valcyte, followed by us at the office.  She is admitted this time to [facility] on 03/12/2024, sent to the ED by our office after she had presented to the office the morning of admission with report of high fever spikes up to 103 though with no much of other complaints at the time.  Her son who is a hospitalist in [place] is in the room currently, just reminded her that she had reported some dysuria. She has been extensively evaluated and noted to have fevers of up to 103.9 with associated leukocytosis of 18.75, anemic with low albumin.  Urinalysis is abnormal with > 100 WBC, 500 LE, trace bacteria a urine culture with more than 100,000 colonies of Gram-negative rods.  Blood cultures have been negative.  Peritoneal fluid analysis not impressive with  and culture negative.  Hepatitis B surface antigen positive.  Chest x-ray with no acute cardiopulmonary process.  CT scan of the abdomen and pelvis without contrast showed atrophy 5 native kidneys compatible with end-stage renal disease, left lower quadrant renal transplant, no yany hydronephrosis or perinephric collection, peritoneal dialysis catheter with small volume free intraperitoneal fluid, hiatal hernia.  She did have issues with hypotension requiring fluid resuscitation and initiation of midodrine.    She is currently on antibiotic coverage with Zosyn.     Subjective:  No new complaints, no fevers, doing  about the same. In no acute distress.      Past Medical History:   Diagnosis Date    Allergy     Iodine    Anemia     Chronic kidney disease on chronic dialysis     Chronic kidney disease, unspecified     E. coli sepsis 2023    with acute renal failure    Encounter for blood transfusion 3033    Hypertension     Menopause     Vertigo     From covid shot     Past Surgical History:   Procedure Laterality Date     SECTION      HYSTERECTOMY      INSERTION, CATHETER, DIALYSIS, PERITONEAL, LAPAROSCOPIC N/A 2024    Procedure: INSERTION, CATHETER, DIALYSIS, PERITONEAL, LAPAROSCOPIC;  Surgeon: Julio Lofton MD;  Location: [facility];  Service: General;  Laterality: N/A;    KIDNEY TRANSPLANT      TUBAL LIGATION       Social History     Socioeconomic History    Marital status: Single   Tobacco Use    Smoking status: Never     Passive exposure: Never    Smokeless tobacco: Never   Substance and Sexual Activity    Alcohol use: Never    Drug use: Never    Sexual activity: Yes     Partners: Male     Social Determinants of Health     Financial Resource Strain: Low Risk  (3/14/2024)    Overall Financial Resource Strain (CARDIA)     Difficulty of Paying Living Expenses: Not very hard   Food Insecurity: No Food Insecurity (3/14/2024)    Hunger Vital Sign     Worried About Running Out of Food in the Last Year: Never true     Ran Out of Food in the Last Year: Never true   Transportation Needs: No Transportation Needs (3/14/2024)    PRAPARE - Transportation     Lack of Transportation (Medical): No     Lack of Transportation (Non-Medical): No   Physical Activity: Unknown (2023)    Exercise Vital Sign     Days of Exercise per Week: 5 days   Stress: No Stress Concern Present (3/14/2024)    [employer] - Occupational Stress Questionnaire     Feeling of Stress : Not at all   Social Connections: Moderately Integrated (3/14/2024)    Social Connection and Isolation  "Panel [NHANES]     Frequency of Communication with Friends and Family: More than three times a week     Frequency of Social Gatherings with Friends and Family: More than three times a week     Attends Adventism Services: More than 4 times per year     Active Member of Clubs or Organizations: Yes     Attends Club or Organization Meetings: More than 4 times per year     Marital Status:    Housing Stability: Low Risk  (3/14/2024)    Housing Stability Vital Sign     Unable to Pay for Housing in the Last Year: No     Number of Places Lived in the Last Year: 1     Unstable Housing in the Last Year: No       ROS  Constitutional:  Positive for fever and malaise/fatigue.   HENT: Negative.     Respiratory: Negative.     Gastrointestinal:  Positive for diarrhea.   Genitourinary: Negative.    Musculoskeletal: Negative.    Endo/Heme/Allergies: Negative.    Psychiatric/Behavioral: Negative.    All other Systems review done and negative.    Review of patient's allergies indicates:   Allergen Reactions    Iodine     Shellfish containing products Rash         Scheduled Meds:   azaTHIOprine  25 mg Oral Daily    enoxparin  30 mg Subcutaneous Q24H (prophylaxis, 1700)    piperacillin-tazobactam (Zosyn) IV (PEDS and ADULTS) (extended infusion is not appropriate)  4.5 g Intravenous Q12H    predniSONE  10 mg Oral Daily    sodium bicarbonate  650 mg Oral TID     Continuous Infusions:  PRN Meds:acetaminophen, hydrALAZINE, ondansetron    Objective:  BP (!) 153/81   Pulse 66   Temp 98.3 °F (36.8 °C) (Oral)   Resp 18   Ht 5' 7" (1.702 m)   Wt 80.7 kg (178 lb)   SpO2 97%   BMI 27.88 kg/m²     Physical Exam:   Physical Exam  Vitals reviewed.   Constitutional:       General: She is not in acute distress.     Appearance: She is obese. She is not toxic-appearing.   HENT:      Head: Normocephalic and atraumatic.   Cardiovascular:      Rate and Rhythm: Normal rate and regular rhythm.      Heart sounds: Normal heart " sounds.   Pulmonary:      Effort: Pulmonary effort is normal.      Breath sounds: Normal breath sounds.   Abdominal:      General: Bowel sounds are normal. There is no distension.      Palpations: Abdomen is soft.      Tenderness: There is no abdominal tenderness.      Comments: PD catheter site intact with no redness and no drainage   Musculoskeletal:      Cervical back: Neck supple.      Right lower leg: No edema.      Left lower leg: No edema.   Skin:     Findings: No erythema or rash.   Neurological:      Mental Status: She is alert and oriented to person, place, and time.   Psychiatric:         Thought Content: Thought content normal.     Imaging  Imaging Results              CT Abdomen Pelvis  Without Contrast (Final result)  Result time 03/13/24 11:04:12      Final result by Donya Kim MD (03/13/24 11:04:12)                   Impression:      1. Atrophied native kidneys compatible with end-stage renal disease.  There is a left lower quadrant renal transplant.  No yany hydronephrosis or perinephric collection.  Further characterization limited on noncontrast CT.  If there is acute concern for transplant failure suggest sonogram  2. Peritoneal dialysis catheter with small volume free intraperitoneal fluid  3. Hiatal hernia      Electronically signed by: Donya Kim  Date:    03/13/2024  Time:    11:04               Narrative:    EXAMINATION:  CT ABDOMEN PELVIS WITHOUT CONTRAST    CLINICAL HISTORY:  Sepsis;UTI, recurrent/complicated (Female);    TECHNIQUE:  Helically acquired axial images, sagittal and coronal reformations were obtained from the lung bases to the pubic symphysis without the IV administration of contrast.    Automated tube current modulation, weight-based exposure dosing, and/or iterative reconstruction technique utilized to reach lowest reasonably achievable exposure rate.    DLP: 293 mGy*cm    COMPARISON:  No relevant prior available for comparison at the time of  dictation.    FINDINGS:  HEART: There are coronary artery calcifications.    LUNG BASES: Well aerated.    LIVER: Normal attenuation. No appreciable focal hepatic lesion.    BILIARY: No calcified gallstones.    PANCREAS: No inflammatory change.    SPLEEN: Normal in size    ADRENALS: No mass.    KIDNEYS/URETERS: Atrophied native kidneys.  No hydronephrosis.  Left lower quadrant renal transplant.  No yany hydronephrosis.    GI TRACT/MESENTERY: Evaluation of the bowel is limited without contrast.  Hiatal small no evidence of bowel obstruction or inflammation. The appendix is normal.    PERITONEUM: Small volume free intraperitoneal fluid.  Peritoneal dialysis catheter is coiled at the lower abdomen/pelvis near midline.No free air.    LYMPH NODES: Nonspecific left iliac chain lymph nodes measure up to 1.1 cm short axis.    VASCULATURE: No significant atherosclerosis or aneurysm.    BLADDER: Nondistended bladder limits CT evaluation    REPRODUCTIVE ORGANS: Uterus is surgically absent.    ABDOMINAL WALL: Unremarkable.    BONES: No acute osseous abnormality.                                       X-Ray Chest PA And Lateral (Final result)  Result time 03/12/24 14:41:06      Final result by Micah Ritter MD (03/12/24 14:41:06)                   Impression:      No acute cardiopulmonary process.      Electronically signed by: Micah Ritter  Date:    03/12/2024  Time:    14:41               Narrative:    EXAMINATION:  XR CHEST PA AND LATERAL    CLINICAL HISTORY:  Fever, unspecified    TECHNIQUE:  Two views of the chest    COMPARISON:  No prior imaging available for comparison.    FINDINGS:  No focal opacification, pleural effusion, or pneumothorax.    The cardiomediastinal silhouette is within normal limits.    No acute osseous abnormality.                        Wet Read by Duong Gaytan MD (03/12/24 14:40:51, [facility] - Emergency Dept, Emergency Medicine)    Neg                                        Lab Review   Recent Results (from the past 24 hour(s))   Comprehensive Metabolic Panel    Collection Time: 03/14/24  4:56 AM   Result Value Ref Range    Sodium Level 140 136 - 145 mmol/L    Potassium Level 3.3 (L) 3.5 - 5.1 mmol/L    Chloride 105 98 - 107 mmol/L    Carbon Dioxide 23 23 - 31 mmol/L    Glucose Level 86 82 - 115 mg/dL    Blood Urea Nitrogen 40.3 (H) 9.8 - 20.1 mg/dL    Creatinine 5.17 (H) 0.55 - 1.02 mg/dL    Calcium Level Total 8.6 8.4 - 10.2 mg/dL    Protein Total 5.7 (L) 5.8 - 7.6 gm/dL    Albumin Level 2.5 (L) 3.4 - 4.8 g/dL    Globulin 3.2 2.4 - 3.5 gm/dL    Albumin/Globulin Ratio 0.8 (L) 1.1 - 2.0 ratio    Bilirubin Total 0.3 <=1.5 mg/dL    Alkaline Phosphatase 77 40 - 150 unit/L    Alanine Aminotransferase 69 (H) 0 - 55 unit/L    Aspartate Aminotransferase 36 (H) 5 - 34 unit/L    eGFR 9 mls/min/1.73/m2   Phosphorus    Collection Time: 03/14/24  4:56 AM   Result Value Ref Range    Phosphorus Level 4.2 2.3 - 4.7 mg/dL   CBC with Differential    Collection Time: 03/14/24  4:56 AM   Result Value Ref Range    WBC 12.31 (H) 4.50 - 11.50 x10(3)/mcL    RBC 3.31 (L) 4.20 - 5.40 x10(6)/mcL    Hgb 10.4 (L) 12.0 - 16.0 g/dL    Hct 30.2 (L) 37.0 - 47.0 %    MCV 91.2 80.0 - 94.0 fL    MCH 31.4 (H) 27.0 - 31.0 pg    MCHC 34.4 33.0 - 36.0 g/dL    RDW 14.8 11.5 - 17.0 %    Platelet 171 130 - 400 x10(3)/mcL    MPV 10.7 (H) 7.4 - 10.4 fL    Neut % 72.4 %    Lymph % 18.9 %    Mono % 7.1 %    Eos % 1.0 %    Basophil % 0.2 %    Lymph # 2.33 0.6 - 4.6 x10(3)/mcL    Neut # 8.92 2.1 - 9.2 x10(3)/mcL    Mono # 0.87 0.1 - 1.3 x10(3)/mcL    Eos # 0.12 0 - 0.9 x10(3)/mcL    Baso # 0.02 <=0.2 x10(3)/mcL    IG# 0.05 (H) 0 - 0.04 x10(3)/mcL    IG% 0.4 %    NRBC% 0.0 %             Assessment/Plan:  1. Sepsis with fevers, leukocytosis and hypotension  2. Gram-negative complicated UTI   3. Immunocompromised s/p kidney transplant on immunosuppressive drugs  4.  End-stage renal disease on peritoneal dialysis   5.  Hepatitis-B  infection  6.  Diarrhea  7.  Anemia     -Immunocompromised patient with history of end-stage renal disease status post failed kidney transplant on immunosuppressive drugs presenting with high fever spikes and leukocytosis with workup revealing abnormal urinalysis and Gram-negative rods isolated from the urine suggesting a primary source of infection.  She was noted to have very high fever spikes just like her previous admissions, noted to have fevers of up to 104 during both admissions including that of November with similar presentation of sepsis secondary to UTI.  She does seem to have responded to treatment temperature curve and leukocytosis trending down, hence we will continue current antibiotic coverage with Zosyn and follow final urine cultures for identification of the Gram-negative with susceptibilities as well as follow final blood cultures.  We will follow with labs in a.m. and continue to trend temperature curve.  She is noted to have positive hepatitis-B surface antigen which was recently negative and will be followed by quantitative hepatitis-B DNA level.  We will continue peritoneal dialysis per Nephrology.  Case is discussed at length with patient, her son who is an MD at bedside, and daughter in-law, also an MD on speaker phone.  I have also discussed the case with nursing staff.

## 2024-03-15 NOTE — PROGRESS NOTES
03/15/24 0853   [facility] Peritoneal Dialysis   Initial Drain Volume (mL) 43 mL   Effluent Appearance Yellow   Number of Cycles 3   Fill Volume (mL) 2000 mL   Total Volume (mL) 6000 mL   Net Positive (mL) 128 mL   Cycler Treatment Comments Pt deaccessed per p&p. Sterile cap applied. Effluent yellow and clear. No issues present.

## 2024-03-15 NOTE — PLAN OF CARE
Problem: Adult Inpatient Plan of Care  Goal: Plan of Care Review  3/14/2024 2300 by Jelena Patel RN  Outcome: Ongoing, Progressing  3/14/2024 2255 by Jelena Patel RN  Outcome: Ongoing, Progressing  Goal: Patient-Specific Goal (Individualized)  3/14/2024 2300 by Jelena Patel RN  Outcome: Ongoing, Progressing  3/14/2024 2255 by Jelena Patel RN  Outcome: Ongoing, Progressing  Goal: Absence of Hospital-Acquired Illness or Injury  3/14/2024 2300 by Jelena Patel RN  Outcome: Ongoing, Progressing  3/14/2024 2255 by Jelena Patel RN  Outcome: Ongoing, Progressing  Goal: Optimal Comfort and Wellbeing  3/14/2024 2300 by Jelena Patel RN  Outcome: Ongoing, Progressing  3/14/2024 2255 by Jelena Patel RN  Outcome: Ongoing, Progressing  Goal: Readiness for Transition of Care  3/14/2024 2300 by Jelena Patel RN  Outcome: Ongoing, Progressing  3/14/2024 2255 by Jelena Patel RN  Outcome: Ongoing, Progressing     Problem: Infection  Goal: Absence of Infection Signs and Symptoms  3/14/2024 2300 by Jelena Patel RN  Outcome: Ongoing, Progressing  3/14/2024 2255 by Jelena Patel RN  Outcome: Ongoing, Progressing

## 2024-03-16 LAB — HBV DNA SERPL NAA+PROBE-ACNC: NORMAL IU/ML

## 2024-03-17 LAB
BACTERIA BLD CULT: NORMAL
BACTERIA BLD CULT: NORMAL
BACTERIA FLD CULT: NORMAL

## 2024-03-18 LAB
BACTERIA BLD CULT: NORMAL
BACTERIA BLD CULT: NORMAL

## 2024-03-19 ENCOUNTER — PATIENT OUTREACH (OUTPATIENT)
Dept: ADMINISTRATIVE | Facility: CLINIC | Age: 59
End: 2024-03-19
Payer: COMMERCIAL

## 2024-03-25 ENCOUNTER — OUTSIDE PLACE OF SERVICE (OUTPATIENT)
Dept: NEPHROLOGY | Facility: CLINIC | Age: 59
End: 2024-03-25
Payer: COMMERCIAL

## 2024-04-15 ENCOUNTER — OUTSIDE PLACE OF SERVICE (OUTPATIENT)
Dept: NEPHROLOGY | Facility: CLINIC | Age: 59
End: 2024-04-15
Payer: COMMERCIAL

## 2024-05-20 ENCOUNTER — OUTSIDE PLACE OF SERVICE (OUTPATIENT)
Dept: NEPHROLOGY | Facility: CLINIC | Age: 59
End: 2024-05-20
Payer: COMMERCIAL

## 2024-06-17 ENCOUNTER — OUTSIDE PLACE OF SERVICE (OUTPATIENT)
Dept: NEPHROLOGY | Facility: CLINIC | Age: 59
End: 2024-06-17
Payer: COMMERCIAL

## 2024-07-22 ENCOUNTER — OUTSIDE PLACE OF SERVICE (OUTPATIENT)
Dept: NEPHROLOGY | Facility: CLINIC | Age: 59
End: 2024-07-22
Payer: COMMERCIAL

## 2024-08-12 ENCOUNTER — OUTSIDE PLACE OF SERVICE (OUTPATIENT)
Dept: NEPHROLOGY | Facility: CLINIC | Age: 59
End: 2024-08-12
Payer: COMMERCIAL

## 2024-09-23 ENCOUNTER — OUTSIDE PLACE OF SERVICE (OUTPATIENT)
Dept: NEPHROLOGY | Facility: CLINIC | Age: 59
End: 2024-09-23
Payer: COMMERCIAL

## 2024-10-21 ENCOUNTER — OUTSIDE PLACE OF SERVICE (OUTPATIENT)
Dept: NEPHROLOGY | Facility: CLINIC | Age: 59
End: 2024-10-21
Payer: COMMERCIAL

## 2024-11-26 ENCOUNTER — OUTSIDE PLACE OF SERVICE (OUTPATIENT)
Dept: NEPHROLOGY | Facility: CLINIC | Age: 59
End: 2024-11-26
Payer: COMMERCIAL

## 2025-01-06 ENCOUNTER — OUTSIDE PLACE OF SERVICE (OUTPATIENT)
Dept: NEPHROLOGY | Facility: CLINIC | Age: 60
End: 2025-01-06
Payer: MEDICARE

## 2025-03-12 DIAGNOSIS — R91.8 PULMONARY NODULES: ICD-10-CM

## 2025-03-12 DIAGNOSIS — J90 PLEURAL EFFUSION: Primary | ICD-10-CM

## 2025-03-24 ENCOUNTER — OUTSIDE PLACE OF SERVICE (OUTPATIENT)
Dept: NEPHROLOGY | Facility: CLINIC | Age: 60
End: 2025-03-24
Payer: MEDICARE

## 2025-04-03 DIAGNOSIS — N18.6 ESRD (END STAGE RENAL DISEASE): ICD-10-CM

## 2025-04-03 DIAGNOSIS — N28.1 RENAL CYSTS, ACQUIRED, BILATERAL: Primary | ICD-10-CM

## 2025-04-21 ENCOUNTER — OUTSIDE PLACE OF SERVICE (OUTPATIENT)
Dept: NEPHROLOGY | Facility: CLINIC | Age: 60
End: 2025-04-21
Payer: COMMERCIAL

## 2025-05-19 ENCOUNTER — OUTSIDE PLACE OF SERVICE (OUTPATIENT)
Dept: NEPHROLOGY | Facility: CLINIC | Age: 60
End: 2025-05-19
Payer: COMMERCIAL

## 2025-06-16 ENCOUNTER — OUTSIDE PLACE OF SERVICE (OUTPATIENT)
Dept: NEPHROLOGY | Facility: CLINIC | Age: 60
End: 2025-06-16
Payer: COMMERCIAL

## (undated) DEVICE — NDL HYPO REG 25G X 1 1/2

## (undated) DEVICE — SUT VICRYL PLUS 4-0 FS-2 27IN

## (undated) DEVICE — BAG MEDI-PLAST DECANTER C-FLOW

## (undated) DEVICE — SYR ONLY LUER LOCK 20CC

## (undated) DEVICE — CANNULA ENDOPATH XCEL 5X100MM

## (undated) DEVICE — GLOVE SIGNATURE MICRO LTX 6.5

## (undated) DEVICE — TROCAR ENDOPATH XCEL 5X100MM

## (undated) DEVICE — NDL GRANEE OPEN LOOP GRASPER

## (undated) DEVICE — ADHESIVE DERMABOND ADVANCED

## (undated) DEVICE — Device

## (undated) DEVICE — GLOVE PROTEXIS HYDROGEL SZ7.5

## (undated) DEVICE — TROCAR ENDOPATH XCEL 11MM 10CM

## (undated) DEVICE — PAD ABDOMINAL STERILE 5X9IN

## (undated) DEVICE — SUT 2-0 ETHILON 18 FS

## (undated) DEVICE — NDL SYR 10ML 18X1.5 LL BLUNT

## (undated) DEVICE — SOL IRRI STRL WATER 1000ML

## (undated) DEVICE — GAUZE SPONGE 4X4 12PLY